# Patient Record
Sex: FEMALE | Race: BLACK OR AFRICAN AMERICAN | NOT HISPANIC OR LATINO | URBAN - METROPOLITAN AREA
[De-identification: names, ages, dates, MRNs, and addresses within clinical notes are randomized per-mention and may not be internally consistent; named-entity substitution may affect disease eponyms.]

---

## 2017-04-22 ENCOUNTER — INPATIENT (INPATIENT)
Facility: HOSPITAL | Age: 82
LOS: 9 days | Discharge: HOME | End: 2017-05-02
Attending: SURGERY | Admitting: SURGERY
Payer: MEDICARE

## 2017-04-22 VITALS
DIASTOLIC BLOOD PRESSURE: 59 MMHG | OXYGEN SATURATION: 98 % | TEMPERATURE: 98 F | HEART RATE: 64 BPM | RESPIRATION RATE: 16 BRPM | SYSTOLIC BLOOD PRESSURE: 152 MMHG

## 2017-04-22 LAB
BASE EXCESS BLDV CALC-SCNC: 5.9 MMOL/L — SIGNIFICANT CHANGE UP
BLOOD GAS VENOUS - CREATININE: 1.89 MG/DL — HIGH (ref 0.5–1.3)
CHLORIDE BLDV-SCNC: 100 MMOL/L — SIGNIFICANT CHANGE UP (ref 96–108)
GAS PNL BLDV: 129 MMOL/L — LOW (ref 136–146)
GLUCOSE BLDV-MCNC: 156 — HIGH (ref 70–99)
HCO3 BLDV-SCNC: 29 MMOL/L — HIGH (ref 20–27)
HCT VFR BLD CALC: 33.3 % — LOW (ref 34.5–45)
HCT VFR BLDV CALC: 35.9 % — SIGNIFICANT CHANGE UP (ref 34.5–45)
HGB BLD-MCNC: 11.6 G/DL — SIGNIFICANT CHANGE UP (ref 11.5–15.5)
HGB BLDV-MCNC: 11.7 G/DL — SIGNIFICANT CHANGE UP (ref 11.5–15.5)
LACTATE BLDV-MCNC: 2.1 MMOL/L — HIGH (ref 0.5–2)
MCHC RBC-ENTMCNC: 30.8 PG — SIGNIFICANT CHANGE UP (ref 27–34)
MCHC RBC-ENTMCNC: 34.8 % — SIGNIFICANT CHANGE UP (ref 32–36)
MCV RBC AUTO: 88.3 FL — SIGNIFICANT CHANGE UP (ref 80–100)
PCO2 BLDV: 45 MMHG — SIGNIFICANT CHANGE UP (ref 41–51)
PH BLDV: 7.44 PH — HIGH (ref 7.32–7.43)
PLATELET # BLD AUTO: 356 K/UL — SIGNIFICANT CHANGE UP (ref 150–400)
PMV BLD: 9.7 FL — SIGNIFICANT CHANGE UP (ref 7–13)
PO2 BLDV: 28 MMHG — LOW (ref 35–40)
POTASSIUM BLDV-SCNC: 3.8 MMOL/L — SIGNIFICANT CHANGE UP (ref 3.4–4.5)
RBC # BLD: 3.77 M/UL — LOW (ref 3.8–5.2)
RBC # FLD: 14.6 % — HIGH (ref 10.3–14.5)
SAO2 % BLDV: 50.7 % — LOW (ref 60–85)
WBC # BLD: 25.74 K/UL — HIGH (ref 3.8–10.5)
WBC # FLD AUTO: 25.74 K/UL — HIGH (ref 3.8–10.5)

## 2017-04-22 NOTE — ED ADULT TRIAGE NOTE - CHIEF COMPLAINT QUOTE
Pt arrives via EMS from home accompanied by her son. Per pts son, pt has been experiencing weakness, decreased appetite and difficulty ambulating x 2+ weeks. Pt was seen by her PMD for same on 4/14 and her son states her PMD states "its due to her age and that the most we can do is monitor the situation" and that he would set her up on a housecall program. Pts son called 911 tonight because pt started c/o abdominal pain to LLQ.

## 2017-04-22 NOTE — ED ADULT NURSE NOTE - CHPI ED SYMPTOMS NEG
no diarrhea/no nausea/no fever/no hematuria/no abdominal distension/no burning urination/no chills/no blood in stool/no dysuria/no vomiting

## 2017-04-22 NOTE — ED ADULT NURSE NOTE - OBJECTIVE STATEMENT
pt on bed aox2, to herself, and place not to time and situation c/o left lower abdominal pain non radiating for approx. 10 days on and off today is worse. also reports decrease PO intake, getting more forgetful per Relatives, denies nausea vomiting fever dysuria diarrhea constipation SOB Ha dizziness CP palpitation MD at bedside eval th ept. will monitor

## 2017-04-23 DIAGNOSIS — K85.10 BILIARY ACUTE PANCREATITIS WITHOUT NECROSIS OR INFECTION: ICD-10-CM

## 2017-04-23 LAB
ALBUMIN SERPL ELPH-MCNC: 2.4 G/DL — LOW (ref 3.3–5)
ALBUMIN SERPL ELPH-MCNC: 2.9 G/DL — LOW (ref 3.3–5)
ALP SERPL-CCNC: 504 U/L — HIGH (ref 40–120)
ALP SERPL-CCNC: 531 U/L — HIGH (ref 40–120)
ALT FLD-CCNC: 106 U/L — HIGH (ref 4–33)
ALT FLD-CCNC: 121 U/L — HIGH (ref 4–33)
ANISOCYTOSIS BLD QL: SIGNIFICANT CHANGE UP
APPEARANCE UR: SIGNIFICANT CHANGE UP
AST SERPL-CCNC: 55 U/L — HIGH (ref 4–32)
AST SERPL-CCNC: 69 U/L — HIGH (ref 4–32)
BASE EXCESS BLDV CALC-SCNC: 5.9 MMOL/L — SIGNIFICANT CHANGE UP
BASOPHILS # BLD AUTO: 0.06 K/UL — SIGNIFICANT CHANGE UP (ref 0–0.2)
BASOPHILS # BLD AUTO: 0.11 K/UL — SIGNIFICANT CHANGE UP (ref 0–0.2)
BASOPHILS NFR BLD AUTO: 0.3 % — SIGNIFICANT CHANGE UP (ref 0–2)
BASOPHILS NFR BLD AUTO: 0.4 % — SIGNIFICANT CHANGE UP (ref 0–2)
BASOPHILS NFR SPEC: 0 % — SIGNIFICANT CHANGE UP (ref 0–2)
BILIRUB SERPL-MCNC: 2.2 MG/DL — HIGH (ref 0.2–1.2)
BILIRUB SERPL-MCNC: 3.1 MG/DL — HIGH (ref 0.2–1.2)
BILIRUB UR-MCNC: NEGATIVE — SIGNIFICANT CHANGE UP
BLOOD GAS VENOUS - CREATININE: 1.66 MG/DL — HIGH (ref 0.5–1.3)
BLOOD UR QL VISUAL: NEGATIVE — SIGNIFICANT CHANGE UP
BUN SERPL-MCNC: 46 MG/DL — HIGH (ref 7–23)
BUN SERPL-MCNC: 56 MG/DL — HIGH (ref 7–23)
CA-I BLD-SCNC: 1.16 MMOL/L — SIGNIFICANT CHANGE UP (ref 1.03–1.23)
CALCIUM SERPL-MCNC: 10 MG/DL — SIGNIFICANT CHANGE UP (ref 8.4–10.5)
CALCIUM SERPL-MCNC: 9.2 MG/DL — SIGNIFICANT CHANGE UP (ref 8.4–10.5)
CHLORIDE BLDV-SCNC: 103 MMOL/L — SIGNIFICANT CHANGE UP (ref 96–108)
CHLORIDE SERPL-SCNC: 90 MMOL/L — LOW (ref 98–107)
CHLORIDE SERPL-SCNC: 99 MMOL/L — SIGNIFICANT CHANGE UP (ref 98–107)
CO2 SERPL-SCNC: 25 MMOL/L — SIGNIFICANT CHANGE UP (ref 22–31)
CO2 SERPL-SCNC: 26 MMOL/L — SIGNIFICANT CHANGE UP (ref 22–31)
COLOR SPEC: YELLOW — SIGNIFICANT CHANGE UP
CREAT SERPL-MCNC: 1.51 MG/DL — HIGH (ref 0.5–1.3)
CREAT SERPL-MCNC: 1.82 MG/DL — HIGH (ref 0.5–1.3)
EOSINOPHIL # BLD AUTO: 0.06 K/UL — SIGNIFICANT CHANGE UP (ref 0–0.5)
EOSINOPHIL # BLD AUTO: 0.09 K/UL — SIGNIFICANT CHANGE UP (ref 0–0.5)
EOSINOPHIL NFR BLD AUTO: 0.3 % — SIGNIFICANT CHANGE UP (ref 0–6)
EOSINOPHIL NFR BLD AUTO: 0.3 % — SIGNIFICANT CHANGE UP (ref 0–6)
EOSINOPHIL NFR FLD: 0 % — SIGNIFICANT CHANGE UP (ref 0–6)
GAS PNL BLDV: 129 MMOL/L — LOW (ref 136–146)
GLUCOSE BLDV-MCNC: 130 — HIGH (ref 70–99)
GLUCOSE SERPL-MCNC: 159 MG/DL — HIGH (ref 70–99)
GLUCOSE SERPL-MCNC: 86 MG/DL — SIGNIFICANT CHANGE UP (ref 70–99)
GLUCOSE UR-MCNC: NEGATIVE — SIGNIFICANT CHANGE UP
HCO3 BLDV-SCNC: 28 MMOL/L — HIGH (ref 20–27)
HCT VFR BLD CALC: 30.8 % — LOW (ref 34.5–45)
HCT VFR BLDV CALC: 34 % — LOW (ref 34.5–45)
HGB BLD-MCNC: 10.5 G/DL — LOW (ref 11.5–15.5)
HGB BLDV-MCNC: 11 G/DL — LOW (ref 11.5–15.5)
HYALINE CASTS # UR AUTO: SIGNIFICANT CHANGE UP (ref 0–?)
IMM GRANULOCYTES NFR BLD AUTO: 5.6 % — HIGH (ref 0–1.5)
IMM GRANULOCYTES NFR BLD AUTO: 6.8 % — HIGH (ref 0–1.5)
KETONES UR-MCNC: NEGATIVE — SIGNIFICANT CHANGE UP
LACTATE BLDV-MCNC: 1.9 MMOL/L — SIGNIFICANT CHANGE UP (ref 0.5–2)
LEUKOCYTE ESTERASE UR-ACNC: NEGATIVE — SIGNIFICANT CHANGE UP
LIDOCAIN IGE QN: 269.4 U/L — HIGH (ref 7–60)
LIDOCAIN IGE QN: > 600 U/L — HIGH (ref 7–60)
LYMPHOCYTES # BLD AUTO: 1.74 K/UL — SIGNIFICANT CHANGE UP (ref 1–3.3)
LYMPHOCYTES # BLD AUTO: 2.21 K/UL — SIGNIFICANT CHANGE UP (ref 1–3.3)
LYMPHOCYTES # BLD AUTO: 8.4 % — LOW (ref 13–44)
LYMPHOCYTES # BLD AUTO: 8.6 % — LOW (ref 13–44)
LYMPHOCYTES NFR SPEC AUTO: 1.7 % — LOW (ref 13–44)
MACROCYTES BLD QL: SIGNIFICANT CHANGE UP
MAGNESIUM SERPL-MCNC: 1.9 MG/DL — SIGNIFICANT CHANGE UP (ref 1.6–2.6)
MCHC RBC-ENTMCNC: 30.3 PG — SIGNIFICANT CHANGE UP (ref 27–34)
MCHC RBC-ENTMCNC: 34.1 % — SIGNIFICANT CHANGE UP (ref 32–36)
MCV RBC AUTO: 89 FL — SIGNIFICANT CHANGE UP (ref 80–100)
METAMYELOCYTES # FLD: 1.7 % — HIGH (ref 0–1)
MONOCYTES # BLD AUTO: 1.9 K/UL — HIGH (ref 0–0.9)
MONOCYTES # BLD AUTO: 2.31 K/UL — HIGH (ref 0–0.9)
MONOCYTES NFR BLD AUTO: 11.1 % — SIGNIFICANT CHANGE UP (ref 2–14)
MONOCYTES NFR BLD AUTO: 7.4 % — SIGNIFICANT CHANGE UP (ref 2–14)
MONOCYTES NFR BLD: 14.5 % — HIGH (ref 2–9)
MUCOUS THREADS # UR AUTO: SIGNIFICANT CHANGE UP
MYELOCYTES NFR BLD: 3.4 % — HIGH (ref 0–0)
NEUTROPHIL AB SER-ACNC: 75.2 % — SIGNIFICANT CHANGE UP (ref 43–77)
NEUTROPHILS # BLD AUTO: 15.42 K/UL — HIGH (ref 1.8–7.4)
NEUTROPHILS # BLD AUTO: 19.68 K/UL — HIGH (ref 1.8–7.4)
NEUTROPHILS NFR BLD AUTO: 74.3 % — SIGNIFICANT CHANGE UP (ref 43–77)
NEUTROPHILS NFR BLD AUTO: 76.5 % — SIGNIFICANT CHANGE UP (ref 43–77)
NEUTS BAND # BLD: 2.6 % — SIGNIFICANT CHANGE UP (ref 0–6)
NITRITE UR-MCNC: NEGATIVE — SIGNIFICANT CHANGE UP
PCO2 BLDV: 41 MMHG — SIGNIFICANT CHANGE UP (ref 41–51)
PH BLDV: 7.47 PH — HIGH (ref 7.32–7.43)
PH UR: 6 — SIGNIFICANT CHANGE UP (ref 4.6–8)
PHOSPHATE SERPL-MCNC: 2.7 MG/DL — SIGNIFICANT CHANGE UP (ref 2.5–4.5)
PLATELET # BLD AUTO: 386 K/UL — SIGNIFICANT CHANGE UP (ref 150–400)
PLATELET COUNT - ESTIMATE: NORMAL — SIGNIFICANT CHANGE UP
PMV BLD: 10 FL — SIGNIFICANT CHANGE UP (ref 7–13)
PO2 BLDV: < 24 MMHG — LOW (ref 35–40)
POTASSIUM BLDV-SCNC: 3.8 MMOL/L — SIGNIFICANT CHANGE UP (ref 3.4–4.5)
POTASSIUM SERPL-MCNC: 3.9 MMOL/L — SIGNIFICANT CHANGE UP (ref 3.5–5.3)
POTASSIUM SERPL-MCNC: 4.1 MMOL/L — SIGNIFICANT CHANGE UP (ref 3.5–5.3)
POTASSIUM SERPL-SCNC: 3.9 MMOL/L — SIGNIFICANT CHANGE UP (ref 3.5–5.3)
POTASSIUM SERPL-SCNC: 4.1 MMOL/L — SIGNIFICANT CHANGE UP (ref 3.5–5.3)
PROT SERPL-MCNC: 6.4 G/DL — SIGNIFICANT CHANGE UP (ref 6–8.3)
PROT SERPL-MCNC: 7 G/DL — SIGNIFICANT CHANGE UP (ref 6–8.3)
PROT UR-MCNC: 30 — HIGH
RBC # BLD: 3.46 M/UL — LOW (ref 3.8–5.2)
RBC # FLD: 14.7 % — HIGH (ref 10.3–14.5)
RBC CASTS # UR COMP ASSIST: SIGNIFICANT CHANGE UP (ref 0–?)
SAO2 % BLDV: 26.2 % — LOW (ref 60–85)
SODIUM SERPL-SCNC: 135 MMOL/L — SIGNIFICANT CHANGE UP (ref 135–145)
SODIUM SERPL-SCNC: 140 MMOL/L — SIGNIFICANT CHANGE UP (ref 135–145)
SP GR SPEC: 1.01 — SIGNIFICANT CHANGE UP (ref 1–1.03)
SPECIMEN SOURCE: SIGNIFICANT CHANGE UP
SQUAMOUS # UR AUTO: SIGNIFICANT CHANGE UP
UROBILINOGEN FLD QL: 8 E.U. — HIGH (ref 0.1–0.2)
VARIANT LYMPHS # BLD: 0.9 % — SIGNIFICANT CHANGE UP
WBC # BLD: 20.75 K/UL — HIGH (ref 3.8–10.5)
WBC # FLD AUTO: 20.75 K/UL — HIGH (ref 3.8–10.5)
WBC CLUMPS #/AREA URNS HPF: PRESENT — HIGH (ref 0–?)
WBC UR QL: HIGH (ref 0–?)

## 2017-04-23 PROCEDURE — 99223 1ST HOSP IP/OBS HIGH 75: CPT | Mod: AI,GC

## 2017-04-23 PROCEDURE — 99223 1ST HOSP IP/OBS HIGH 75: CPT | Mod: 25

## 2017-04-23 PROCEDURE — 78226 HEPATOBILIARY SYSTEM IMAGING: CPT | Mod: 26,GC

## 2017-04-23 PROCEDURE — 74176 CT ABD & PELVIS W/O CONTRAST: CPT | Mod: 26

## 2017-04-23 PROCEDURE — 76705 ECHO EXAM OF ABDOMEN: CPT | Mod: 26

## 2017-04-23 PROCEDURE — 71010: CPT | Mod: 26

## 2017-04-23 RX ORDER — TIMOLOL 0.5 %
1 DROPS OPHTHALMIC (EYE)
Qty: 0 | Refills: 0 | COMMUNITY

## 2017-04-23 RX ORDER — TIMOLOL 0.5 %
1 DROPS OPHTHALMIC (EYE)
Qty: 0 | Refills: 0 | Status: DISCONTINUED | OUTPATIENT
Start: 2017-04-23 | End: 2017-04-23

## 2017-04-23 RX ORDER — TIMOLOL 0.5 %
1 DROPS OPHTHALMIC (EYE) DAILY
Qty: 0 | Refills: 0 | Status: DISCONTINUED | OUTPATIENT
Start: 2017-04-23 | End: 2017-05-02

## 2017-04-23 RX ORDER — SODIUM CHLORIDE 9 MG/ML
1000 INJECTION INTRAMUSCULAR; INTRAVENOUS; SUBCUTANEOUS
Qty: 0 | Refills: 0 | Status: DISCONTINUED | OUTPATIENT
Start: 2017-04-23 | End: 2017-04-24

## 2017-04-23 RX ORDER — PIPERACILLIN AND TAZOBACTAM 4; .5 G/20ML; G/20ML
3.38 INJECTION, POWDER, LYOPHILIZED, FOR SOLUTION INTRAVENOUS EVERY 12 HOURS
Qty: 0 | Refills: 0 | Status: DISCONTINUED | OUTPATIENT
Start: 2017-04-23 | End: 2017-04-23

## 2017-04-23 RX ORDER — HEPARIN SODIUM 5000 [USP'U]/ML
5000 INJECTION INTRAVENOUS; SUBCUTANEOUS EVERY 8 HOURS
Qty: 0 | Refills: 0 | Status: DISCONTINUED | OUTPATIENT
Start: 2017-04-23 | End: 2017-04-27

## 2017-04-23 RX ORDER — SODIUM CHLORIDE 9 MG/ML
1000 INJECTION INTRAMUSCULAR; INTRAVENOUS; SUBCUTANEOUS ONCE
Qty: 0 | Refills: 0 | Status: COMPLETED | OUTPATIENT
Start: 2017-04-23 | End: 2017-04-23

## 2017-04-23 RX ORDER — PIPERACILLIN AND TAZOBACTAM 4; .5 G/20ML; G/20ML
3.38 INJECTION, POWDER, LYOPHILIZED, FOR SOLUTION INTRAVENOUS ONCE
Qty: 0 | Refills: 0 | Status: COMPLETED | OUTPATIENT
Start: 2017-04-23 | End: 2017-04-23

## 2017-04-23 RX ORDER — LATANOPROST 0.05 MG/ML
1 SOLUTION/ DROPS OPHTHALMIC; TOPICAL AT BEDTIME
Qty: 0 | Refills: 0 | Status: DISCONTINUED | OUTPATIENT
Start: 2017-04-23 | End: 2017-05-02

## 2017-04-23 RX ORDER — LATANOPROST 0.05 MG/ML
1 SOLUTION/ DROPS OPHTHALMIC; TOPICAL
Qty: 0 | Refills: 0 | COMMUNITY

## 2017-04-23 RX ORDER — IMIPENEM AND CILASTATIN 250; 250 MG/100ML; MG/100ML
500 INJECTION, POWDER, FOR SOLUTION INTRAVENOUS ONCE
Qty: 0 | Refills: 0 | Status: COMPLETED | OUTPATIENT
Start: 2017-04-23 | End: 2017-04-23

## 2017-04-23 RX ORDER — IMIPENEM AND CILASTATIN 250; 250 MG/100ML; MG/100ML
INJECTION, POWDER, FOR SOLUTION INTRAVENOUS
Qty: 0 | Refills: 0 | Status: DISCONTINUED | OUTPATIENT
Start: 2017-04-23 | End: 2017-04-23

## 2017-04-23 RX ORDER — IMIPENEM AND CILASTATIN 250; 250 MG/100ML; MG/100ML
500 INJECTION, POWDER, FOR SOLUTION INTRAVENOUS EVERY 12 HOURS
Qty: 0 | Refills: 0 | Status: DISCONTINUED | OUTPATIENT
Start: 2017-04-23 | End: 2017-04-27

## 2017-04-23 RX ORDER — IMIPENEM AND CILASTATIN 250; 250 MG/100ML; MG/100ML
INJECTION, POWDER, FOR SOLUTION INTRAVENOUS
Qty: 0 | Refills: 0 | Status: DISCONTINUED | OUTPATIENT
Start: 2017-04-23 | End: 2017-04-27

## 2017-04-23 RX ADMIN — IMIPENEM AND CILASTATIN 100 MILLIGRAM(S): 250; 250 INJECTION, POWDER, FOR SOLUTION INTRAVENOUS at 10:17

## 2017-04-23 RX ADMIN — SODIUM CHLORIDE 100 MILLILITER(S): 9 INJECTION INTRAMUSCULAR; INTRAVENOUS; SUBCUTANEOUS at 07:22

## 2017-04-23 RX ADMIN — IMIPENEM AND CILASTATIN 100 MILLIGRAM(S): 250; 250 INJECTION, POWDER, FOR SOLUTION INTRAVENOUS at 22:27

## 2017-04-23 RX ADMIN — SODIUM CHLORIDE 100 MILLILITER(S): 9 INJECTION INTRAMUSCULAR; INTRAVENOUS; SUBCUTANEOUS at 05:42

## 2017-04-23 RX ADMIN — HEPARIN SODIUM 5000 UNIT(S): 5000 INJECTION INTRAVENOUS; SUBCUTANEOUS at 13:12

## 2017-04-23 RX ADMIN — PIPERACILLIN AND TAZOBACTAM 200 GRAM(S): 4; .5 INJECTION, POWDER, LYOPHILIZED, FOR SOLUTION INTRAVENOUS at 02:27

## 2017-04-23 RX ADMIN — SODIUM CHLORIDE 100 MILLILITER(S): 9 INJECTION INTRAMUSCULAR; INTRAVENOUS; SUBCUTANEOUS at 19:30

## 2017-04-23 RX ADMIN — LATANOPROST 1 DROP(S): 0.05 SOLUTION/ DROPS OPHTHALMIC; TOPICAL at 22:27

## 2017-04-23 RX ADMIN — HEPARIN SODIUM 5000 UNIT(S): 5000 INJECTION INTRAVENOUS; SUBCUTANEOUS at 07:23

## 2017-04-23 RX ADMIN — HEPARIN SODIUM 5000 UNIT(S): 5000 INJECTION INTRAVENOUS; SUBCUTANEOUS at 22:27

## 2017-04-23 RX ADMIN — SODIUM CHLORIDE 1000 MILLILITER(S): 9 INJECTION INTRAMUSCULAR; INTRAVENOUS; SUBCUTANEOUS at 02:27

## 2017-04-23 NOTE — ED ADULT NURSE REASSESSMENT NOTE - NS ED NURSE REASSESS COMMENT FT1
break coverage RN - VS as noted, pt in NAD, straight catheterized for urine per MD order, additional labs and urine sent, pt medicated per orders, cleaned changed and repositioned for comfort, pt noted with stage 2 pressure ulcer to sacrum, generalized redness to sacrum and buttocks, and skin tear noted to L upper thigh. will continue to monitor pt.

## 2017-04-23 NOTE — ED PROVIDER NOTE - OBJECTIVE STATEMENT
Attending  89yo F BIBEMS from home with son  and family co abdominal pain. Son states that she has been complaining of LLQ pain for "several days" worse tonight. Dec po intake over several weeks. no vomit or diarrhea. no fever or chills. +urine and bowel incontinent. +"very weak last three days" unable to get up even with assistance. Normally is able to stand and walk w "some help" no fall/trauma. no cp or sob. no dysuria/freq or urgency.  no change in meds. no sick contact.

## 2017-04-23 NOTE — H&P ADULT. - HISTORY OF PRESENT ILLNESS
88F with 3 days of RUQ pain. No nausea, vomiting, fever, chills. She walks with a walker but lately she couldn't walk and became lethargic. She was reporting pain in RUQ, so the son brought her in. She is afebrile in ED but WBc of 25. Pain is constant and in RUQ.

## 2017-04-23 NOTE — ED PROVIDER NOTE - PHYSICAL EXAMINATION
Attending pt in bed, no acute distress. EOMI, non icteric, no juandice. mmm. normal S1-S2 no resp distress. soft nondistended abdomen tender at the LLQ. no guarding or rebound. Alert to self and place, follow simple commands. moves all ext.  bl. nl strength bl lower ext.

## 2017-04-23 NOTE — ED ADULT NURSE REASSESSMENT NOTE - NS ED NURSE REASSESS COMMENT FT1
Pt pulled catheter off, no urine at this time.  Straight cath attempted but tube will not pass.  Pt wife has urinal.  MD notified.

## 2017-04-23 NOTE — ED PROVIDER NOTE - CONSTITUTIONAL, MLM
normal... Well appearing, well nourished, awake, alert, oriented to person, place and in no apparent distress.

## 2017-04-23 NOTE — ED ADULT NURSE REASSESSMENT NOTE - NS ED NURSE REASSESS COMMENT FT1
Pt taken to US by transporter.  Pt report to SICU given by FARSHAD RN.  Pt transported by ED tech and RN to SICU.

## 2017-04-24 LAB
ALBUMIN SERPL ELPH-MCNC: 2.5 G/DL — LOW (ref 3.3–5)
ALP SERPL-CCNC: 415 U/L — HIGH (ref 40–120)
ALT FLD-CCNC: 78 U/L — HIGH (ref 4–33)
APTT BLD: 38.1 SEC — HIGH (ref 27.5–37.4)
AST SERPL-CCNC: 33 U/L — HIGH (ref 4–32)
BACTERIA UR CULT: SIGNIFICANT CHANGE UP
BILIRUB SERPL-MCNC: 1.9 MG/DL — HIGH (ref 0.2–1.2)
BUN SERPL-MCNC: 19 MG/DL — SIGNIFICANT CHANGE UP (ref 7–23)
BUN SERPL-MCNC: 25 MG/DL — HIGH (ref 7–23)
BUN SERPL-MCNC: 33 MG/DL — HIGH (ref 7–23)
CA-I BLD-SCNC: 1.15 MMOL/L — SIGNIFICANT CHANGE UP (ref 1.03–1.23)
CALCIUM SERPL-MCNC: 5.8 MG/DL — CRITICAL LOW (ref 8.4–10.5)
CALCIUM SERPL-MCNC: 9.2 MG/DL — SIGNIFICANT CHANGE UP (ref 8.4–10.5)
CALCIUM SERPL-MCNC: 9.4 MG/DL — SIGNIFICANT CHANGE UP (ref 8.4–10.5)
CHLORIDE SERPL-SCNC: 105 MMOL/L — SIGNIFICANT CHANGE UP (ref 98–107)
CHLORIDE SERPL-SCNC: 113 MMOL/L — HIGH (ref 98–107)
CHLORIDE SERPL-SCNC: 99 MMOL/L — SIGNIFICANT CHANGE UP (ref 98–107)
CO2 SERPL-SCNC: 15 MMOL/L — LOW (ref 22–31)
CO2 SERPL-SCNC: 23 MMOL/L — SIGNIFICANT CHANGE UP (ref 22–31)
CO2 SERPL-SCNC: 24 MMOL/L — SIGNIFICANT CHANGE UP (ref 22–31)
CREAT SERPL-MCNC: 0.61 MG/DL — SIGNIFICANT CHANGE UP (ref 0.5–1.3)
CREAT SERPL-MCNC: 0.96 MG/DL — SIGNIFICANT CHANGE UP (ref 0.5–1.3)
CREAT SERPL-MCNC: 1.1 MG/DL — SIGNIFICANT CHANGE UP (ref 0.5–1.3)
GLUCOSE SERPL-MCNC: 100 MG/DL — HIGH (ref 70–99)
GLUCOSE SERPL-MCNC: 120 MG/DL — HIGH (ref 70–99)
GLUCOSE SERPL-MCNC: 70 MG/DL — SIGNIFICANT CHANGE UP (ref 70–99)
HCT VFR BLD CALC: 32.9 % — LOW (ref 34.5–45)
HGB BLD-MCNC: 11 G/DL — LOW (ref 11.5–15.5)
INR BLD: 1.11 — SIGNIFICANT CHANGE UP (ref 0.88–1.17)
LIDOCAIN IGE QN: 48.9 U/L — SIGNIFICANT CHANGE UP (ref 7–60)
MAGNESIUM SERPL-MCNC: 1.7 MG/DL — SIGNIFICANT CHANGE UP (ref 1.6–2.6)
MCHC RBC-ENTMCNC: 30 PG — SIGNIFICANT CHANGE UP (ref 27–34)
MCHC RBC-ENTMCNC: 33.4 % — SIGNIFICANT CHANGE UP (ref 32–36)
MCV RBC AUTO: 89.6 FL — SIGNIFICANT CHANGE UP (ref 80–100)
PHOSPHATE SERPL-MCNC: 2.7 MG/DL — SIGNIFICANT CHANGE UP (ref 2.5–4.5)
PLATELET # BLD AUTO: 395 K/UL — SIGNIFICANT CHANGE UP (ref 150–400)
PMV BLD: 9.5 FL — SIGNIFICANT CHANGE UP (ref 7–13)
POTASSIUM SERPL-MCNC: 3.6 MMOL/L — SIGNIFICANT CHANGE UP (ref 3.5–5.3)
POTASSIUM SERPL-MCNC: 4 MMOL/L — SIGNIFICANT CHANGE UP (ref 3.5–5.3)
POTASSIUM SERPL-MCNC: 7.6 MMOL/L — CRITICAL HIGH (ref 3.5–5.3)
POTASSIUM SERPL-SCNC: 3.6 MMOL/L — SIGNIFICANT CHANGE UP (ref 3.5–5.3)
POTASSIUM SERPL-SCNC: 4 MMOL/L — SIGNIFICANT CHANGE UP (ref 3.5–5.3)
POTASSIUM SERPL-SCNC: 7.6 MMOL/L — CRITICAL HIGH (ref 3.5–5.3)
PROT SERPL-MCNC: 6.3 G/DL — SIGNIFICANT CHANGE UP (ref 6–8.3)
PROTHROM AB SERPL-ACNC: 12.5 SEC — SIGNIFICANT CHANGE UP (ref 9.8–13.1)
RBC # BLD: 3.67 M/UL — LOW (ref 3.8–5.2)
RBC # FLD: 15.2 % — HIGH (ref 10.3–14.5)
SODIUM SERPL-SCNC: 140 MMOL/L — SIGNIFICANT CHANGE UP (ref 135–145)
SODIUM SERPL-SCNC: 142 MMOL/L — SIGNIFICANT CHANGE UP (ref 135–145)
SODIUM SERPL-SCNC: 143 MMOL/L — SIGNIFICANT CHANGE UP (ref 135–145)
SPECIMEN SOURCE: SIGNIFICANT CHANGE UP
SPECIMEN SOURCE: SIGNIFICANT CHANGE UP
WBC # BLD: 16 K/UL — HIGH (ref 3.8–10.5)
WBC # FLD AUTO: 16 K/UL — HIGH (ref 3.8–10.5)

## 2017-04-24 PROCEDURE — 99232 SBSQ HOSP IP/OBS MODERATE 35: CPT | Mod: GC

## 2017-04-24 PROCEDURE — 74182 MRI ABDOMEN W/CONTRAST: CPT | Mod: 26

## 2017-04-24 RX ORDER — POTASSIUM CHLORIDE 20 MEQ
10 PACKET (EA) ORAL
Qty: 0 | Refills: 0 | Status: COMPLETED | OUTPATIENT
Start: 2017-04-24 | End: 2017-04-24

## 2017-04-24 RX ORDER — ATENOLOL 25 MG/1
25 TABLET ORAL DAILY
Qty: 0 | Refills: 0 | Status: DISCONTINUED | OUTPATIENT
Start: 2017-04-24 | End: 2017-05-02

## 2017-04-24 RX ORDER — SODIUM CHLORIDE 9 MG/ML
1000 INJECTION, SOLUTION INTRAVENOUS
Qty: 0 | Refills: 0 | Status: DISCONTINUED | OUTPATIENT
Start: 2017-04-24 | End: 2017-04-25

## 2017-04-24 RX ORDER — MAGNESIUM SULFATE 500 MG/ML
2 VIAL (ML) INJECTION ONCE
Qty: 0 | Refills: 0 | Status: COMPLETED | OUTPATIENT
Start: 2017-04-24 | End: 2017-04-24

## 2017-04-24 RX ADMIN — HEPARIN SODIUM 5000 UNIT(S): 5000 INJECTION INTRAVENOUS; SUBCUTANEOUS at 22:46

## 2017-04-24 RX ADMIN — Medication 100 MILLIEQUIVALENT(S): at 10:16

## 2017-04-24 RX ADMIN — HEPARIN SODIUM 5000 UNIT(S): 5000 INJECTION INTRAVENOUS; SUBCUTANEOUS at 15:38

## 2017-04-24 RX ADMIN — SODIUM CHLORIDE 100 MILLILITER(S): 9 INJECTION, SOLUTION INTRAVENOUS at 12:07

## 2017-04-24 RX ADMIN — IMIPENEM AND CILASTATIN 100 MILLIGRAM(S): 250; 250 INJECTION, POWDER, FOR SOLUTION INTRAVENOUS at 22:46

## 2017-04-24 RX ADMIN — Medication 100 MILLIEQUIVALENT(S): at 09:07

## 2017-04-24 RX ADMIN — IMIPENEM AND CILASTATIN 100 MILLIGRAM(S): 250; 250 INJECTION, POWDER, FOR SOLUTION INTRAVENOUS at 10:17

## 2017-04-24 RX ADMIN — Medication 100 MILLIEQUIVALENT(S): at 08:05

## 2017-04-24 RX ADMIN — Medication 1 DROP(S): at 12:07

## 2017-04-24 RX ADMIN — Medication 50 GRAM(S): at 06:00

## 2017-04-24 RX ADMIN — LATANOPROST 1 DROP(S): 0.05 SOLUTION/ DROPS OPHTHALMIC; TOPICAL at 22:46

## 2017-04-24 RX ADMIN — HEPARIN SODIUM 5000 UNIT(S): 5000 INJECTION INTRAVENOUS; SUBCUTANEOUS at 05:51

## 2017-04-24 RX ADMIN — SODIUM CHLORIDE 100 MILLILITER(S): 9 INJECTION, SOLUTION INTRAVENOUS at 19:30

## 2017-04-25 LAB
-  AMIKACIN: SIGNIFICANT CHANGE UP
-  AMPICILLIN/SULBACTAM: SIGNIFICANT CHANGE UP
-  AMPICILLIN: SIGNIFICANT CHANGE UP
-  AZTREONAM: SIGNIFICANT CHANGE UP
-  CEFAZOLIN: SIGNIFICANT CHANGE UP
-  CEFEPIME: SIGNIFICANT CHANGE UP
-  CEFOXITIN: SIGNIFICANT CHANGE UP
-  CEFTAZIDIME: SIGNIFICANT CHANGE UP
-  CEFTRIAXONE: SIGNIFICANT CHANGE UP
-  CIPROFLOXACIN: SIGNIFICANT CHANGE UP
-  ERTAPENEM: SIGNIFICANT CHANGE UP
-  GENTAMICIN: SIGNIFICANT CHANGE UP
-  IMIPENEM: SIGNIFICANT CHANGE UP
-  LEVOFLOXACIN: SIGNIFICANT CHANGE UP
-  MEROPENEM: SIGNIFICANT CHANGE UP
-  PIPERACILLIN/TAZOBACTAM: SIGNIFICANT CHANGE UP
-  TIGECYCLINE: SIGNIFICANT CHANGE UP
-  TOBRAMYCIN: SIGNIFICANT CHANGE UP
-  TRIMETHOPRIM/SULFAMETHOXAZOLE: SIGNIFICANT CHANGE UP
ALBUMIN SERPL ELPH-MCNC: 2.4 G/DL — LOW (ref 3.3–5)
ALBUMIN SERPL ELPH-MCNC: 2.5 G/DL — LOW (ref 3.3–5)
ALP SERPL-CCNC: 314 U/L — HIGH (ref 40–120)
ALP SERPL-CCNC: 335 U/L — HIGH (ref 40–120)
ALT FLD-CCNC: 51 U/L — HIGH (ref 4–33)
ALT FLD-CCNC: 54 U/L — HIGH (ref 4–33)
AST SERPL-CCNC: 23 U/L — SIGNIFICANT CHANGE UP (ref 4–32)
AST SERPL-CCNC: 23 U/L — SIGNIFICANT CHANGE UP (ref 4–32)
BASOPHILS # BLD AUTO: 0.03 K/UL — SIGNIFICANT CHANGE UP (ref 0–0.2)
BASOPHILS # BLD AUTO: 0.04 K/UL — SIGNIFICANT CHANGE UP (ref 0–0.2)
BASOPHILS NFR BLD AUTO: 0.2 % — SIGNIFICANT CHANGE UP (ref 0–2)
BASOPHILS NFR BLD AUTO: 0.3 % — SIGNIFICANT CHANGE UP (ref 0–2)
BILIRUB SERPL-MCNC: 1.3 MG/DL — HIGH (ref 0.2–1.2)
BILIRUB SERPL-MCNC: 1.5 MG/DL — HIGH (ref 0.2–1.2)
BUN SERPL-MCNC: 13 MG/DL — SIGNIFICANT CHANGE UP (ref 7–23)
BUN SERPL-MCNC: 16 MG/DL — SIGNIFICANT CHANGE UP (ref 7–23)
CA-I BLD-SCNC: 1.18 MMOL/L — SIGNIFICANT CHANGE UP (ref 1.03–1.23)
CALCIUM SERPL-MCNC: 8.9 MG/DL — SIGNIFICANT CHANGE UP (ref 8.4–10.5)
CALCIUM SERPL-MCNC: 9 MG/DL — SIGNIFICANT CHANGE UP (ref 8.4–10.5)
CHLORIDE SERPL-SCNC: 102 MMOL/L — SIGNIFICANT CHANGE UP (ref 98–107)
CHLORIDE SERPL-SCNC: 106 MMOL/L — SIGNIFICANT CHANGE UP (ref 98–107)
CO2 SERPL-SCNC: 22 MMOL/L — SIGNIFICANT CHANGE UP (ref 22–31)
CO2 SERPL-SCNC: 24 MMOL/L — SIGNIFICANT CHANGE UP (ref 22–31)
CREAT SERPL-MCNC: 0.85 MG/DL — SIGNIFICANT CHANGE UP (ref 0.5–1.3)
CREAT SERPL-MCNC: 0.86 MG/DL — SIGNIFICANT CHANGE UP (ref 0.5–1.3)
EOSINOPHIL # BLD AUTO: 0.07 K/UL — SIGNIFICANT CHANGE UP (ref 0–0.5)
EOSINOPHIL # BLD AUTO: 0.09 K/UL — SIGNIFICANT CHANGE UP (ref 0–0.5)
EOSINOPHIL NFR BLD AUTO: 0.5 % — SIGNIFICANT CHANGE UP (ref 0–6)
EOSINOPHIL NFR BLD AUTO: 0.7 % — SIGNIFICANT CHANGE UP (ref 0–6)
GLUCOSE SERPL-MCNC: 107 MG/DL — HIGH (ref 70–99)
GLUCOSE SERPL-MCNC: 163 MG/DL — HIGH (ref 70–99)
GRAM STN WND: SIGNIFICANT CHANGE UP
HCT VFR BLD CALC: 30.6 % — LOW (ref 34.5–45)
HCT VFR BLD CALC: 31.2 % — LOW (ref 34.5–45)
HGB BLD-MCNC: 10.1 G/DL — LOW (ref 11.5–15.5)
HGB BLD-MCNC: 10.3 G/DL — LOW (ref 11.5–15.5)
IMM GRANULOCYTES NFR BLD AUTO: 7.5 % — HIGH (ref 0–1.5)
IMM GRANULOCYTES NFR BLD AUTO: 7.7 % — HIGH (ref 0–1.5)
LIDOCAIN IGE QN: 38.8 U/L — SIGNIFICANT CHANGE UP (ref 7–60)
LYMPHOCYTES # BLD AUTO: 1.67 K/UL — SIGNIFICANT CHANGE UP (ref 1–3.3)
LYMPHOCYTES # BLD AUTO: 12.2 % — LOW (ref 13–44)
LYMPHOCYTES # BLD AUTO: 17.6 % — SIGNIFICANT CHANGE UP (ref 13–44)
LYMPHOCYTES # BLD AUTO: 2.48 K/UL — SIGNIFICANT CHANGE UP (ref 1–3.3)
MAGNESIUM SERPL-MCNC: 1.7 MG/DL — SIGNIFICANT CHANGE UP (ref 1.6–2.6)
MANUAL SMEAR VERIFICATION: SIGNIFICANT CHANGE UP
MCHC RBC-ENTMCNC: 29.8 PG — SIGNIFICANT CHANGE UP (ref 27–34)
MCHC RBC-ENTMCNC: 30 PG — SIGNIFICANT CHANGE UP (ref 27–34)
MCHC RBC-ENTMCNC: 33 % — SIGNIFICANT CHANGE UP (ref 32–36)
MCHC RBC-ENTMCNC: 33 % — SIGNIFICANT CHANGE UP (ref 32–36)
MCV RBC AUTO: 90.3 FL — SIGNIFICANT CHANGE UP (ref 80–100)
MCV RBC AUTO: 91 FL — SIGNIFICANT CHANGE UP (ref 80–100)
METHOD TYPE: SIGNIFICANT CHANGE UP
MONOCYTES # BLD AUTO: 1.12 K/UL — HIGH (ref 0–0.9)
MONOCYTES # BLD AUTO: 1.69 K/UL — HIGH (ref 0–0.9)
MONOCYTES NFR BLD AUTO: 12.4 % — SIGNIFICANT CHANGE UP (ref 2–14)
MONOCYTES NFR BLD AUTO: 7.9 % — SIGNIFICANT CHANGE UP (ref 2–14)
NEUTROPHILS # BLD AUTO: 9.13 K/UL — HIGH (ref 1.8–7.4)
NEUTROPHILS # BLD AUTO: 9.34 K/UL — HIGH (ref 1.8–7.4)
NEUTROPHILS NFR BLD AUTO: 66.3 % — SIGNIFICANT CHANGE UP (ref 43–77)
NEUTROPHILS NFR BLD AUTO: 66.7 % — SIGNIFICANT CHANGE UP (ref 43–77)
ORGANISM # SPEC MICROSCOPIC CNT: SIGNIFICANT CHANGE UP
ORGANISM # SPEC MICROSCOPIC CNT: SIGNIFICANT CHANGE UP
PHOSPHATE SERPL-MCNC: 1.8 MG/DL — LOW (ref 2.5–4.5)
PLATELET # BLD AUTO: 419 K/UL — HIGH (ref 150–400)
PLATELET # BLD AUTO: 427 K/UL — HIGH (ref 150–400)
PMV BLD: 9.1 FL — SIGNIFICANT CHANGE UP (ref 7–13)
PMV BLD: 9.2 FL — SIGNIFICANT CHANGE UP (ref 7–13)
POTASSIUM SERPL-MCNC: 3.6 MMOL/L — SIGNIFICANT CHANGE UP (ref 3.5–5.3)
POTASSIUM SERPL-MCNC: 4.1 MMOL/L — SIGNIFICANT CHANGE UP (ref 3.5–5.3)
POTASSIUM SERPL-SCNC: 3.6 MMOL/L — SIGNIFICANT CHANGE UP (ref 3.5–5.3)
POTASSIUM SERPL-SCNC: 4.1 MMOL/L — SIGNIFICANT CHANGE UP (ref 3.5–5.3)
PROT SERPL-MCNC: 5.9 G/DL — LOW (ref 6–8.3)
PROT SERPL-MCNC: 5.9 G/DL — LOW (ref 6–8.3)
RBC # BLD: 3.39 M/UL — LOW (ref 3.8–5.2)
RBC # BLD: 3.43 M/UL — LOW (ref 3.8–5.2)
RBC # FLD: 15.1 % — HIGH (ref 10.3–14.5)
RBC # FLD: 15.2 % — HIGH (ref 10.3–14.5)
SODIUM SERPL-SCNC: 139 MMOL/L — SIGNIFICANT CHANGE UP (ref 135–145)
SODIUM SERPL-SCNC: 141 MMOL/L — SIGNIFICANT CHANGE UP (ref 135–145)
SPECIMEN SOURCE: SIGNIFICANT CHANGE UP
SPECIMEN SOURCE: SIGNIFICANT CHANGE UP
WBC # BLD: 13.67 K/UL — HIGH (ref 3.8–10.5)
WBC # BLD: 14.09 K/UL — HIGH (ref 3.8–10.5)
WBC # FLD AUTO: 13.67 K/UL — HIGH (ref 3.8–10.5)
WBC # FLD AUTO: 14.09 K/UL — HIGH (ref 3.8–10.5)

## 2017-04-25 PROCEDURE — 99232 SBSQ HOSP IP/OBS MODERATE 35: CPT | Mod: GC

## 2017-04-25 PROCEDURE — 47490 INCISION OF GALLBLADDER: CPT | Mod: GC

## 2017-04-25 RX ORDER — POTASSIUM PHOSPHATE, MONOBASIC POTASSIUM PHOSPHATE, DIBASIC 236; 224 MG/ML; MG/ML
15 INJECTION, SOLUTION INTRAVENOUS ONCE
Qty: 0 | Refills: 0 | Status: COMPLETED | OUTPATIENT
Start: 2017-04-25 | End: 2017-04-25

## 2017-04-25 RX ORDER — SODIUM CHLORIDE 9 MG/ML
1000 INJECTION, SOLUTION INTRAVENOUS
Qty: 0 | Refills: 0 | Status: DISCONTINUED | OUTPATIENT
Start: 2017-04-25 | End: 2017-04-26

## 2017-04-25 RX ORDER — MAGNESIUM SULFATE 500 MG/ML
2 VIAL (ML) INJECTION ONCE
Qty: 0 | Refills: 0 | Status: COMPLETED | OUTPATIENT
Start: 2017-04-25 | End: 2017-04-25

## 2017-04-25 RX ORDER — AMLODIPINE BESYLATE 2.5 MG/1
5 TABLET ORAL DAILY
Qty: 0 | Refills: 0 | Status: DISCONTINUED | OUTPATIENT
Start: 2017-04-25 | End: 2017-05-02

## 2017-04-25 RX ADMIN — HEPARIN SODIUM 5000 UNIT(S): 5000 INJECTION INTRAVENOUS; SUBCUTANEOUS at 06:27

## 2017-04-25 RX ADMIN — AMLODIPINE BESYLATE 5 MILLIGRAM(S): 2.5 TABLET ORAL at 11:33

## 2017-04-25 RX ADMIN — Medication 1 DROP(S): at 11:34

## 2017-04-25 RX ADMIN — POTASSIUM PHOSPHATE, MONOBASIC POTASSIUM PHOSPHATE, DIBASIC 62.5 MILLIMOLE(S): 236; 224 INJECTION, SOLUTION INTRAVENOUS at 06:43

## 2017-04-25 RX ADMIN — POTASSIUM PHOSPHATE, MONOBASIC POTASSIUM PHOSPHATE, DIBASIC 62.5 MILLIMOLE(S): 236; 224 INJECTION, SOLUTION INTRAVENOUS at 11:32

## 2017-04-25 RX ADMIN — SODIUM CHLORIDE 75 MILLILITER(S): 9 INJECTION, SOLUTION INTRAVENOUS at 20:25

## 2017-04-25 RX ADMIN — HEPARIN SODIUM 5000 UNIT(S): 5000 INJECTION INTRAVENOUS; SUBCUTANEOUS at 14:17

## 2017-04-25 RX ADMIN — IMIPENEM AND CILASTATIN 100 MILLIGRAM(S): 250; 250 INJECTION, POWDER, FOR SOLUTION INTRAVENOUS at 10:45

## 2017-04-25 RX ADMIN — LATANOPROST 1 DROP(S): 0.05 SOLUTION/ DROPS OPHTHALMIC; TOPICAL at 22:36

## 2017-04-25 RX ADMIN — IMIPENEM AND CILASTATIN 100 MILLIGRAM(S): 250; 250 INJECTION, POWDER, FOR SOLUTION INTRAVENOUS at 22:30

## 2017-04-25 RX ADMIN — HEPARIN SODIUM 5000 UNIT(S): 5000 INJECTION INTRAVENOUS; SUBCUTANEOUS at 22:36

## 2017-04-25 RX ADMIN — ATENOLOL 25 MILLIGRAM(S): 25 TABLET ORAL at 06:27

## 2017-04-25 RX ADMIN — Medication 50 GRAM(S): at 11:32

## 2017-04-26 LAB
ALBUMIN SERPL ELPH-MCNC: 2.3 G/DL — LOW (ref 3.3–5)
ALP SERPL-CCNC: 270 U/L — HIGH (ref 40–120)
ALT FLD-CCNC: 42 U/L — HIGH (ref 4–33)
AST SERPL-CCNC: 18 U/L — SIGNIFICANT CHANGE UP (ref 4–32)
BASOPHILS # BLD AUTO: 0.03 K/UL — SIGNIFICANT CHANGE UP (ref 0–0.2)
BASOPHILS NFR BLD AUTO: 0.2 % — SIGNIFICANT CHANGE UP (ref 0–2)
BILIRUB SERPL-MCNC: 1.2 MG/DL — SIGNIFICANT CHANGE UP (ref 0.2–1.2)
BUN SERPL-MCNC: 10 MG/DL — SIGNIFICANT CHANGE UP (ref 7–23)
CALCIUM SERPL-MCNC: 9.1 MG/DL — SIGNIFICANT CHANGE UP (ref 8.4–10.5)
CHLORIDE SERPL-SCNC: 106 MMOL/L — SIGNIFICANT CHANGE UP (ref 98–107)
CO2 SERPL-SCNC: 21 MMOL/L — LOW (ref 22–31)
CREAT SERPL-MCNC: 0.81 MG/DL — SIGNIFICANT CHANGE UP (ref 0.5–1.3)
EOSINOPHIL # BLD AUTO: 0.1 K/UL — SIGNIFICANT CHANGE UP (ref 0–0.5)
EOSINOPHIL NFR BLD AUTO: 0.7 % — SIGNIFICANT CHANGE UP (ref 0–6)
GLUCOSE SERPL-MCNC: 117 MG/DL — HIGH (ref 70–99)
HCT VFR BLD CALC: 29.2 % — LOW (ref 34.5–45)
HGB BLD-MCNC: 9.6 G/DL — LOW (ref 11.5–15.5)
IMM GRANULOCYTES NFR BLD AUTO: 7.3 % — HIGH (ref 0–1.5)
LYMPHOCYTES # BLD AUTO: 18.4 % — SIGNIFICANT CHANGE UP (ref 13–44)
LYMPHOCYTES # BLD AUTO: 2.48 K/UL — SIGNIFICANT CHANGE UP (ref 1–3.3)
MAGNESIUM SERPL-MCNC: 1.8 MG/DL — SIGNIFICANT CHANGE UP (ref 1.6–2.6)
MCHC RBC-ENTMCNC: 30.2 PG — SIGNIFICANT CHANGE UP (ref 27–34)
MCHC RBC-ENTMCNC: 32.9 % — SIGNIFICANT CHANGE UP (ref 32–36)
MCV RBC AUTO: 91.8 FL — SIGNIFICANT CHANGE UP (ref 80–100)
MONOCYTES # BLD AUTO: 1.38 K/UL — HIGH (ref 0–0.9)
MONOCYTES NFR BLD AUTO: 10.2 % — SIGNIFICANT CHANGE UP (ref 2–14)
NEUTROPHILS # BLD AUTO: 8.52 K/UL — HIGH (ref 1.8–7.4)
NEUTROPHILS NFR BLD AUTO: 63.2 % — SIGNIFICANT CHANGE UP (ref 43–77)
PHOSPHATE SERPL-MCNC: 2.8 MG/DL — SIGNIFICANT CHANGE UP (ref 2.5–4.5)
PLATELET # BLD AUTO: 455 K/UL — HIGH (ref 150–400)
PMV BLD: 9.6 FL — SIGNIFICANT CHANGE UP (ref 7–13)
POTASSIUM SERPL-MCNC: 3.6 MMOL/L — SIGNIFICANT CHANGE UP (ref 3.5–5.3)
POTASSIUM SERPL-SCNC: 3.6 MMOL/L — SIGNIFICANT CHANGE UP (ref 3.5–5.3)
PROT SERPL-MCNC: 5.8 G/DL — LOW (ref 6–8.3)
RBC # BLD: 3.18 M/UL — LOW (ref 3.8–5.2)
RBC # FLD: 15.5 % — HIGH (ref 10.3–14.5)
SODIUM SERPL-SCNC: 139 MMOL/L — SIGNIFICANT CHANGE UP (ref 135–145)
WBC # BLD: 13.49 K/UL — HIGH (ref 3.8–10.5)
WBC # FLD AUTO: 13.49 K/UL — HIGH (ref 3.8–10.5)

## 2017-04-26 PROCEDURE — 99232 SBSQ HOSP IP/OBS MODERATE 35: CPT

## 2017-04-26 PROCEDURE — 99232 SBSQ HOSP IP/OBS MODERATE 35: CPT | Mod: GC

## 2017-04-26 RX ORDER — POTASSIUM CHLORIDE 20 MEQ
10 PACKET (EA) ORAL
Qty: 0 | Refills: 0 | Status: COMPLETED | OUTPATIENT
Start: 2017-04-26 | End: 2017-04-26

## 2017-04-26 RX ORDER — MAGNESIUM SULFATE 500 MG/ML
2 VIAL (ML) INJECTION ONCE
Qty: 0 | Refills: 0 | Status: COMPLETED | OUTPATIENT
Start: 2017-04-26 | End: 2017-04-26

## 2017-04-26 RX ADMIN — Medication 100 MILLIEQUIVALENT(S): at 13:25

## 2017-04-26 RX ADMIN — Medication 100 MILLIEQUIVALENT(S): at 09:06

## 2017-04-26 RX ADMIN — IMIPENEM AND CILASTATIN 100 MILLIGRAM(S): 250; 250 INJECTION, POWDER, FOR SOLUTION INTRAVENOUS at 21:57

## 2017-04-26 RX ADMIN — HEPARIN SODIUM 5000 UNIT(S): 5000 INJECTION INTRAVENOUS; SUBCUTANEOUS at 21:53

## 2017-04-26 RX ADMIN — LATANOPROST 1 DROP(S): 0.05 SOLUTION/ DROPS OPHTHALMIC; TOPICAL at 21:57

## 2017-04-26 RX ADMIN — AMLODIPINE BESYLATE 5 MILLIGRAM(S): 2.5 TABLET ORAL at 05:41

## 2017-04-26 RX ADMIN — Medication 50 GRAM(S): at 08:02

## 2017-04-26 RX ADMIN — SODIUM CHLORIDE 75 MILLILITER(S): 9 INJECTION, SOLUTION INTRAVENOUS at 00:12

## 2017-04-26 RX ADMIN — IMIPENEM AND CILASTATIN 100 MILLIGRAM(S): 250; 250 INJECTION, POWDER, FOR SOLUTION INTRAVENOUS at 10:26

## 2017-04-26 RX ADMIN — Medication 100 MILLIEQUIVALENT(S): at 11:20

## 2017-04-26 RX ADMIN — Medication 1 DROP(S): at 12:46

## 2017-04-26 RX ADMIN — HEPARIN SODIUM 5000 UNIT(S): 5000 INJECTION INTRAVENOUS; SUBCUTANEOUS at 05:44

## 2017-04-26 RX ADMIN — HEPARIN SODIUM 5000 UNIT(S): 5000 INJECTION INTRAVENOUS; SUBCUTANEOUS at 13:54

## 2017-04-26 NOTE — PHYSICAL THERAPY INITIAL EVALUATION ADULT - PERTINENT HX OF CURRENT PROBLEM, REHAB EVAL
88F with 3 days of RUQ pain. No nausea, vomiting, fever, chills. She walks with a walker but lately she couldn't walk and became lethargic.

## 2017-04-27 LAB
ALBUMIN SERPL ELPH-MCNC: 2.4 G/DL — LOW (ref 3.3–5)
ALP SERPL-CCNC: 233 U/L — HIGH (ref 40–120)
ALT FLD-CCNC: 32 U/L — SIGNIFICANT CHANGE UP (ref 4–33)
AST SERPL-CCNC: 19 U/L — SIGNIFICANT CHANGE UP (ref 4–32)
BILIRUB SERPL-MCNC: 0.9 MG/DL — SIGNIFICANT CHANGE UP (ref 0.2–1.2)
BUN SERPL-MCNC: 9 MG/DL — SIGNIFICANT CHANGE UP (ref 7–23)
CALCIUM SERPL-MCNC: 9.2 MG/DL — SIGNIFICANT CHANGE UP (ref 8.4–10.5)
CHLORIDE SERPL-SCNC: 108 MMOL/L — HIGH (ref 98–107)
CO2 SERPL-SCNC: 22 MMOL/L — SIGNIFICANT CHANGE UP (ref 22–31)
CREAT SERPL-MCNC: 0.87 MG/DL — SIGNIFICANT CHANGE UP (ref 0.5–1.3)
GLUCOSE SERPL-MCNC: 88 MG/DL — SIGNIFICANT CHANGE UP (ref 70–99)
GRAM STN WND: SIGNIFICANT CHANGE UP
HCT VFR BLD CALC: 30.4 % — LOW (ref 34.5–45)
HGB BLD-MCNC: 10 G/DL — LOW (ref 11.5–15.5)
MAGNESIUM SERPL-MCNC: 1.8 MG/DL — SIGNIFICANT CHANGE UP (ref 1.6–2.6)
MCHC RBC-ENTMCNC: 30.2 PG — SIGNIFICANT CHANGE UP (ref 27–34)
MCHC RBC-ENTMCNC: 32.9 % — SIGNIFICANT CHANGE UP (ref 32–36)
MCV RBC AUTO: 91.8 FL — SIGNIFICANT CHANGE UP (ref 80–100)
METHOD TYPE: SIGNIFICANT CHANGE UP
METHOD TYPE: SIGNIFICANT CHANGE UP
ORGANISM # SPEC MICROSCOPIC CNT: SIGNIFICANT CHANGE UP
PHOSPHATE SERPL-MCNC: 2.9 MG/DL — SIGNIFICANT CHANGE UP (ref 2.5–4.5)
PLATELET # BLD AUTO: 479 K/UL — HIGH (ref 150–400)
PMV BLD: 9.3 FL — SIGNIFICANT CHANGE UP (ref 7–13)
POTASSIUM SERPL-MCNC: 4.3 MMOL/L — SIGNIFICANT CHANGE UP (ref 3.5–5.3)
POTASSIUM SERPL-SCNC: 4.3 MMOL/L — SIGNIFICANT CHANGE UP (ref 3.5–5.3)
PROT SERPL-MCNC: 5.9 G/DL — LOW (ref 6–8.3)
RBC # BLD: 3.31 M/UL — LOW (ref 3.8–5.2)
RBC # FLD: 15.7 % — HIGH (ref 10.3–14.5)
SODIUM SERPL-SCNC: 141 MMOL/L — SIGNIFICANT CHANGE UP (ref 135–145)
SPECIMEN SOURCE: SIGNIFICANT CHANGE UP
WBC # BLD: 13.14 K/UL — HIGH (ref 3.8–10.5)
WBC # FLD AUTO: 13.14 K/UL — HIGH (ref 3.8–10.5)

## 2017-04-27 PROCEDURE — 99232 SBSQ HOSP IP/OBS MODERATE 35: CPT

## 2017-04-27 PROCEDURE — 99232 SBSQ HOSP IP/OBS MODERATE 35: CPT | Mod: GC

## 2017-04-27 RX ORDER — ENOXAPARIN SODIUM 100 MG/ML
40 INJECTION SUBCUTANEOUS DAILY
Qty: 0 | Refills: 0 | Status: DISCONTINUED | OUTPATIENT
Start: 2017-04-27 | End: 2017-05-02

## 2017-04-27 RX ORDER — SODIUM CHLORIDE 9 MG/ML
1000 INJECTION, SOLUTION INTRAVENOUS
Qty: 0 | Refills: 0 | Status: DISCONTINUED | OUTPATIENT
Start: 2017-04-27 | End: 2017-04-28

## 2017-04-27 RX ORDER — HYDRALAZINE HCL 50 MG
10 TABLET ORAL ONCE
Qty: 0 | Refills: 0 | Status: COMPLETED | OUTPATIENT
Start: 2017-04-27 | End: 2017-04-27

## 2017-04-27 RX ADMIN — ATENOLOL 25 MILLIGRAM(S): 25 TABLET ORAL at 05:13

## 2017-04-27 RX ADMIN — LATANOPROST 1 DROP(S): 0.05 SOLUTION/ DROPS OPHTHALMIC; TOPICAL at 22:36

## 2017-04-27 RX ADMIN — AMLODIPINE BESYLATE 5 MILLIGRAM(S): 2.5 TABLET ORAL at 05:13

## 2017-04-27 RX ADMIN — Medication 10 MILLIGRAM(S): at 23:40

## 2017-04-27 RX ADMIN — Medication 1 TABLET(S): at 19:02

## 2017-04-27 RX ADMIN — Medication 1 DROP(S): at 11:55

## 2017-04-27 RX ADMIN — ENOXAPARIN SODIUM 40 MILLIGRAM(S): 100 INJECTION SUBCUTANEOUS at 11:55

## 2017-04-28 LAB
-  AMIKACIN: SIGNIFICANT CHANGE UP
-  AMIKACIN: SIGNIFICANT CHANGE UP
-  AMPICILLIN/SULBACTAM: SIGNIFICANT CHANGE UP
-  AMPICILLIN/SULBACTAM: SIGNIFICANT CHANGE UP
-  AMPICILLIN: SIGNIFICANT CHANGE UP
-  AMPICILLIN: SIGNIFICANT CHANGE UP
-  AZTREONAM: SIGNIFICANT CHANGE UP
-  AZTREONAM: SIGNIFICANT CHANGE UP
-  CEFAZOLIN: SIGNIFICANT CHANGE UP
-  CEFAZOLIN: SIGNIFICANT CHANGE UP
-  CEFEPIME: SIGNIFICANT CHANGE UP
-  CEFEPIME: SIGNIFICANT CHANGE UP
-  CEFOXITIN: SIGNIFICANT CHANGE UP
-  CEFOXITIN: SIGNIFICANT CHANGE UP
-  CEFTAZIDIME: SIGNIFICANT CHANGE UP
-  CEFTAZIDIME: SIGNIFICANT CHANGE UP
-  CEFTRIAXONE: SIGNIFICANT CHANGE UP
-  CEFTRIAXONE: SIGNIFICANT CHANGE UP
-  CIPROFLOXACIN: SIGNIFICANT CHANGE UP
-  CIPROFLOXACIN: SIGNIFICANT CHANGE UP
-  ERTAPENEM: SIGNIFICANT CHANGE UP
-  ERTAPENEM: SIGNIFICANT CHANGE UP
-  GENTAMICIN: SIGNIFICANT CHANGE UP
-  GENTAMICIN: SIGNIFICANT CHANGE UP
-  IMIPENEM: SIGNIFICANT CHANGE UP
-  IMIPENEM: SIGNIFICANT CHANGE UP
-  LEVOFLOXACIN: SIGNIFICANT CHANGE UP
-  LEVOFLOXACIN: SIGNIFICANT CHANGE UP
-  MEROPENEM: SIGNIFICANT CHANGE UP
-  MEROPENEM: SIGNIFICANT CHANGE UP
-  PENICILLIN: SIGNIFICANT CHANGE UP
-  PIPERACILLIN/TAZOBACTAM: SIGNIFICANT CHANGE UP
-  PIPERACILLIN/TAZOBACTAM: SIGNIFICANT CHANGE UP
-  TIGECYCLINE: SIGNIFICANT CHANGE UP
-  TIGECYCLINE: SIGNIFICANT CHANGE UP
-  TOBRAMYCIN: SIGNIFICANT CHANGE UP
-  TOBRAMYCIN: SIGNIFICANT CHANGE UP
-  TRIMETHOPRIM/SULFAMETHOXAZOLE: SIGNIFICANT CHANGE UP
-  TRIMETHOPRIM/SULFAMETHOXAZOLE: SIGNIFICANT CHANGE UP
-  VANCOMYCIN: SIGNIFICANT CHANGE UP
ALBUMIN SERPL ELPH-MCNC: 2.5 G/DL — LOW (ref 3.3–5)
ALP SERPL-CCNC: 211 U/L — HIGH (ref 40–120)
ALT FLD-CCNC: 30 U/L — SIGNIFICANT CHANGE UP (ref 4–33)
AST SERPL-CCNC: 20 U/L — SIGNIFICANT CHANGE UP (ref 4–32)
BACTERIA BLD CULT: SIGNIFICANT CHANGE UP
BILIRUB SERPL-MCNC: 0.8 MG/DL — SIGNIFICANT CHANGE UP (ref 0.2–1.2)
BUN SERPL-MCNC: 9 MG/DL — SIGNIFICANT CHANGE UP (ref 7–23)
CALCIUM SERPL-MCNC: 9.3 MG/DL — SIGNIFICANT CHANGE UP (ref 8.4–10.5)
CHLORIDE SERPL-SCNC: 106 MMOL/L — SIGNIFICANT CHANGE UP (ref 98–107)
CO2 SERPL-SCNC: 21 MMOL/L — LOW (ref 22–31)
CREAT SERPL-MCNC: 0.8 MG/DL — SIGNIFICANT CHANGE UP (ref 0.5–1.3)
CULTURE - SURGICAL SITE: SIGNIFICANT CHANGE UP
GLUCOSE SERPL-MCNC: 83 MG/DL — SIGNIFICANT CHANGE UP (ref 70–99)
HCT VFR BLD CALC: 31.1 % — LOW (ref 34.5–45)
HGB BLD-MCNC: 10.2 G/DL — LOW (ref 11.5–15.5)
MCHC RBC-ENTMCNC: 30.2 PG — SIGNIFICANT CHANGE UP (ref 27–34)
MCHC RBC-ENTMCNC: 32.8 % — SIGNIFICANT CHANGE UP (ref 32–36)
MCV RBC AUTO: 92 FL — SIGNIFICANT CHANGE UP (ref 80–100)
METHOD TYPE: SIGNIFICANT CHANGE UP
PLATELET # BLD AUTO: 550 K/UL — HIGH (ref 150–400)
PMV BLD: 9.2 FL — SIGNIFICANT CHANGE UP (ref 7–13)
POTASSIUM SERPL-MCNC: 4.5 MMOL/L — SIGNIFICANT CHANGE UP (ref 3.5–5.3)
POTASSIUM SERPL-SCNC: 4.5 MMOL/L — SIGNIFICANT CHANGE UP (ref 3.5–5.3)
PROT SERPL-MCNC: 6.1 G/DL — SIGNIFICANT CHANGE UP (ref 6–8.3)
RBC # BLD: 3.38 M/UL — LOW (ref 3.8–5.2)
RBC # FLD: 15.8 % — HIGH (ref 10.3–14.5)
SODIUM SERPL-SCNC: 141 MMOL/L — SIGNIFICANT CHANGE UP (ref 135–145)
WBC # BLD: 14.02 K/UL — HIGH (ref 3.8–10.5)
WBC # FLD AUTO: 14.02 K/UL — HIGH (ref 3.8–10.5)

## 2017-04-28 PROCEDURE — 43239 EGD BIOPSY SINGLE/MULTIPLE: CPT | Mod: 59,GC

## 2017-04-28 PROCEDURE — 88312 SPECIAL STAINS GROUP 1: CPT | Mod: 26

## 2017-04-28 PROCEDURE — 88305 TISSUE EXAM BY PATHOLOGIST: CPT | Mod: 26

## 2017-04-28 PROCEDURE — 43264 ERCP REMOVE DUCT CALCULI: CPT | Mod: GC

## 2017-04-28 PROCEDURE — 43262 ENDO CHOLANGIOPANCREATOGRAPH: CPT | Mod: GC

## 2017-04-28 PROCEDURE — 43259 EGD US EXAM DUODENUM/JEJUNUM: CPT | Mod: GC

## 2017-04-28 RX ORDER — SODIUM CHLORIDE 9 MG/ML
1000 INJECTION, SOLUTION INTRAVENOUS
Qty: 0 | Refills: 0 | Status: DISCONTINUED | OUTPATIENT
Start: 2017-04-28 | End: 2017-05-01

## 2017-04-28 RX ADMIN — SODIUM CHLORIDE 75 MILLILITER(S): 9 INJECTION, SOLUTION INTRAVENOUS at 00:11

## 2017-04-28 RX ADMIN — Medication 1 DROP(S): at 12:36

## 2017-04-28 RX ADMIN — Medication 1 TABLET(S): at 06:03

## 2017-04-28 RX ADMIN — ATENOLOL 25 MILLIGRAM(S): 25 TABLET ORAL at 06:03

## 2017-04-28 RX ADMIN — AMLODIPINE BESYLATE 5 MILLIGRAM(S): 2.5 TABLET ORAL at 06:03

## 2017-04-28 RX ADMIN — SODIUM CHLORIDE 75 MILLILITER(S): 9 INJECTION, SOLUTION INTRAVENOUS at 06:02

## 2017-04-28 RX ADMIN — Medication 1 TABLET(S): at 17:39

## 2017-04-28 RX ADMIN — SODIUM CHLORIDE 75 MILLILITER(S): 9 INJECTION, SOLUTION INTRAVENOUS at 21:19

## 2017-04-28 RX ADMIN — LATANOPROST 1 DROP(S): 0.05 SOLUTION/ DROPS OPHTHALMIC; TOPICAL at 21:19

## 2017-04-28 RX ADMIN — ENOXAPARIN SODIUM 40 MILLIGRAM(S): 100 INJECTION SUBCUTANEOUS at 12:36

## 2017-04-29 LAB
ALBUMIN SERPL ELPH-MCNC: 2.4 G/DL — LOW (ref 3.3–5)
ALP SERPL-CCNC: 194 U/L — HIGH (ref 40–120)
ALT FLD-CCNC: 22 U/L — SIGNIFICANT CHANGE UP (ref 4–33)
AST SERPL-CCNC: 16 U/L — SIGNIFICANT CHANGE UP (ref 4–32)
BACTERIA BLD CULT: SIGNIFICANT CHANGE UP
BILIRUB SERPL-MCNC: 0.7 MG/DL — SIGNIFICANT CHANGE UP (ref 0.2–1.2)
BUN SERPL-MCNC: 16 MG/DL — SIGNIFICANT CHANGE UP (ref 7–23)
CALCIUM SERPL-MCNC: 9.1 MG/DL — SIGNIFICANT CHANGE UP (ref 8.4–10.5)
CHLORIDE SERPL-SCNC: 104 MMOL/L — SIGNIFICANT CHANGE UP (ref 98–107)
CO2 SERPL-SCNC: 21 MMOL/L — LOW (ref 22–31)
CREAT SERPL-MCNC: 1.12 MG/DL — SIGNIFICANT CHANGE UP (ref 0.5–1.3)
GLUCOSE SERPL-MCNC: 178 MG/DL — HIGH (ref 70–99)
HCT VFR BLD CALC: 32 % — LOW (ref 34.5–45)
HGB BLD-MCNC: 10.4 G/DL — LOW (ref 11.5–15.5)
LIDOCAIN IGE QN: 24.8 U/L — SIGNIFICANT CHANGE UP (ref 7–60)
MCHC RBC-ENTMCNC: 30 PG — SIGNIFICANT CHANGE UP (ref 27–34)
MCHC RBC-ENTMCNC: 32.5 % — SIGNIFICANT CHANGE UP (ref 32–36)
MCV RBC AUTO: 92.2 FL — SIGNIFICANT CHANGE UP (ref 80–100)
PLATELET # BLD AUTO: 549 K/UL — HIGH (ref 150–400)
PMV BLD: 9.2 FL — SIGNIFICANT CHANGE UP (ref 7–13)
POTASSIUM SERPL-MCNC: 4.6 MMOL/L — SIGNIFICANT CHANGE UP (ref 3.5–5.3)
POTASSIUM SERPL-SCNC: 4.6 MMOL/L — SIGNIFICANT CHANGE UP (ref 3.5–5.3)
PROT SERPL-MCNC: 5.9 G/DL — LOW (ref 6–8.3)
RBC # BLD: 3.47 M/UL — LOW (ref 3.8–5.2)
RBC # FLD: 15.8 % — HIGH (ref 10.3–14.5)
SODIUM SERPL-SCNC: 138 MMOL/L — SIGNIFICANT CHANGE UP (ref 135–145)
WBC # BLD: 14.53 K/UL — HIGH (ref 3.8–10.5)
WBC # FLD AUTO: 14.53 K/UL — HIGH (ref 3.8–10.5)

## 2017-04-29 RX ADMIN — Medication 1 TABLET(S): at 17:06

## 2017-04-29 RX ADMIN — AMLODIPINE BESYLATE 5 MILLIGRAM(S): 2.5 TABLET ORAL at 05:47

## 2017-04-29 RX ADMIN — ENOXAPARIN SODIUM 40 MILLIGRAM(S): 100 INJECTION SUBCUTANEOUS at 12:14

## 2017-04-29 RX ADMIN — SODIUM CHLORIDE 75 MILLILITER(S): 9 INJECTION, SOLUTION INTRAVENOUS at 17:06

## 2017-04-29 RX ADMIN — LATANOPROST 1 DROP(S): 0.05 SOLUTION/ DROPS OPHTHALMIC; TOPICAL at 21:39

## 2017-04-29 RX ADMIN — ATENOLOL 25 MILLIGRAM(S): 25 TABLET ORAL at 05:47

## 2017-04-29 RX ADMIN — Medication 1 DROP(S): at 12:14

## 2017-04-29 RX ADMIN — Medication 1 TABLET(S): at 05:47

## 2017-04-29 RX ADMIN — SODIUM CHLORIDE 75 MILLILITER(S): 9 INJECTION, SOLUTION INTRAVENOUS at 21:39

## 2017-04-30 LAB
ALBUMIN SERPL ELPH-MCNC: 2.6 G/DL — LOW (ref 3.3–5)
ALP SERPL-CCNC: 174 U/L — HIGH (ref 40–120)
ALT FLD-CCNC: 23 U/L — SIGNIFICANT CHANGE UP (ref 4–33)
AST SERPL-CCNC: 15 U/L — SIGNIFICANT CHANGE UP (ref 4–32)
BILIRUB SERPL-MCNC: 0.6 MG/DL — SIGNIFICANT CHANGE UP (ref 0.2–1.2)
BUN SERPL-MCNC: 15 MG/DL — SIGNIFICANT CHANGE UP (ref 7–23)
CALCIUM SERPL-MCNC: 8.8 MG/DL — SIGNIFICANT CHANGE UP (ref 8.4–10.5)
CHLORIDE SERPL-SCNC: 105 MMOL/L — SIGNIFICANT CHANGE UP (ref 98–107)
CO2 SERPL-SCNC: 22 MMOL/L — SIGNIFICANT CHANGE UP (ref 22–31)
CREAT SERPL-MCNC: 1.16 MG/DL — SIGNIFICANT CHANGE UP (ref 0.5–1.3)
GLUCOSE SERPL-MCNC: 105 MG/DL — HIGH (ref 70–99)
HCT VFR BLD CALC: 31.7 % — LOW (ref 34.5–45)
HGB BLD-MCNC: 10.1 G/DL — LOW (ref 11.5–15.5)
MAGNESIUM SERPL-MCNC: 1.8 MG/DL — SIGNIFICANT CHANGE UP (ref 1.6–2.6)
MCHC RBC-ENTMCNC: 29.5 PG — SIGNIFICANT CHANGE UP (ref 27–34)
MCHC RBC-ENTMCNC: 31.9 % — LOW (ref 32–36)
MCV RBC AUTO: 92.7 FL — SIGNIFICANT CHANGE UP (ref 80–100)
PHOSPHATE SERPL-MCNC: 2.8 MG/DL — SIGNIFICANT CHANGE UP (ref 2.5–4.5)
PLATELET # BLD AUTO: 548 K/UL — HIGH (ref 150–400)
PMV BLD: 8.8 FL — SIGNIFICANT CHANGE UP (ref 7–13)
POTASSIUM SERPL-MCNC: 4.2 MMOL/L — SIGNIFICANT CHANGE UP (ref 3.5–5.3)
POTASSIUM SERPL-SCNC: 4.2 MMOL/L — SIGNIFICANT CHANGE UP (ref 3.5–5.3)
PROT SERPL-MCNC: 5.9 G/DL — LOW (ref 6–8.3)
RBC # BLD: 3.42 M/UL — LOW (ref 3.8–5.2)
RBC # FLD: 15.8 % — HIGH (ref 10.3–14.5)
SODIUM SERPL-SCNC: 139 MMOL/L — SIGNIFICANT CHANGE UP (ref 135–145)
WBC # BLD: 16.67 K/UL — HIGH (ref 3.8–10.5)
WBC # FLD AUTO: 16.67 K/UL — HIGH (ref 3.8–10.5)

## 2017-04-30 PROCEDURE — 74177 CT ABD & PELVIS W/CONTRAST: CPT | Mod: 26

## 2017-04-30 RX ORDER — MORPHINE SULFATE 50 MG/1
2 CAPSULE, EXTENDED RELEASE ORAL ONCE
Qty: 0 | Refills: 0 | Status: DISCONTINUED | OUTPATIENT
Start: 2017-04-30 | End: 2017-04-30

## 2017-04-30 RX ORDER — SODIUM CHLORIDE 9 MG/ML
1000 INJECTION INTRAMUSCULAR; INTRAVENOUS; SUBCUTANEOUS ONCE
Qty: 0 | Refills: 0 | Status: COMPLETED | OUTPATIENT
Start: 2017-04-30 | End: 2017-04-30

## 2017-04-30 RX ORDER — MAGNESIUM SULFATE 500 MG/ML
2 VIAL (ML) INJECTION ONCE
Qty: 0 | Refills: 0 | Status: COMPLETED | OUTPATIENT
Start: 2017-04-30 | End: 2017-04-30

## 2017-04-30 RX ADMIN — SODIUM CHLORIDE 100 MILLILITER(S): 9 INJECTION, SOLUTION INTRAVENOUS at 21:24

## 2017-04-30 RX ADMIN — LATANOPROST 1 DROP(S): 0.05 SOLUTION/ DROPS OPHTHALMIC; TOPICAL at 21:24

## 2017-04-30 RX ADMIN — SODIUM CHLORIDE 1000 MILLILITER(S): 9 INJECTION INTRAMUSCULAR; INTRAVENOUS; SUBCUTANEOUS at 17:17

## 2017-04-30 RX ADMIN — Medication 1 DROP(S): at 11:29

## 2017-04-30 RX ADMIN — Medication 1 TABLET(S): at 17:15

## 2017-04-30 RX ADMIN — SODIUM CHLORIDE 100 MILLILITER(S): 9 INJECTION, SOLUTION INTRAVENOUS at 10:49

## 2017-04-30 RX ADMIN — Medication 1 TABLET(S): at 05:37

## 2017-04-30 RX ADMIN — ENOXAPARIN SODIUM 40 MILLIGRAM(S): 100 INJECTION SUBCUTANEOUS at 11:28

## 2017-04-30 RX ADMIN — Medication 50 GRAM(S): at 12:35

## 2017-04-30 RX ADMIN — AMLODIPINE BESYLATE 5 MILLIGRAM(S): 2.5 TABLET ORAL at 05:37

## 2017-04-30 NOTE — PROVIDER CONTACT NOTE (OTHER) - ASSESSMENT
Bladder scanner done 260cc of urine showed
pt reports 5/10 left aching shoulder pain; denies chest pain, SOB, /67, HR 62

## 2017-05-01 PROCEDURE — 99232 SBSQ HOSP IP/OBS MODERATE 35: CPT | Mod: GC

## 2017-05-01 RX ADMIN — Medication 63.75 MILLIMOLE(S): at 11:55

## 2017-05-01 RX ADMIN — Medication 1 DROP(S): at 11:56

## 2017-05-01 RX ADMIN — AMLODIPINE BESYLATE 5 MILLIGRAM(S): 2.5 TABLET ORAL at 05:12

## 2017-05-01 RX ADMIN — Medication 1 TABLET(S): at 05:12

## 2017-05-01 RX ADMIN — ATENOLOL 25 MILLIGRAM(S): 25 TABLET ORAL at 05:12

## 2017-05-01 RX ADMIN — LATANOPROST 1 DROP(S): 0.05 SOLUTION/ DROPS OPHTHALMIC; TOPICAL at 21:45

## 2017-05-01 RX ADMIN — ENOXAPARIN SODIUM 40 MILLIGRAM(S): 100 INJECTION SUBCUTANEOUS at 11:56

## 2017-05-01 NOTE — PROVIDER CONTACT NOTE (OTHER) - SITUATION
Patient did not void overnight. Bladder scan done showed >260cc of urine
Patient is ordered for ua/ucx midstream.  Patient is incontinent.  Attempted to toilet patient throughout the night with bedpan but patient has not voided in bedpan was only incontinent.
Patient with left shoulder pain 5/10

## 2017-05-01 NOTE — PROVIDER CONTACT NOTE (OTHER) - BACKGROUND
s/p Right IR percutaneous drain placement; s/p EGD
Patient is s/p cholecystostomy, ERCP.
gallstone pancreatitis; s/p IR tube placement  reports hx of arthritis

## 2017-05-01 NOTE — DIETITIAN INITIAL EVALUATION ADULT. - OTHER INFO
Nutrition assessment completed for length of stay; admitted for gallstone pancreatitis s/p MRCP. Met with patient, reports she is eating a lot better with 100% of breakfast this morning. Denies food allergies, GI distress (nausea/vomiting/constipation/diarrhea), or issues with chewing/swallowing. She reports UBW ranges between 110 to 120# without any significant changes. Patient had no nutrition-related questions or concerns for dietitian at this time.

## 2017-05-01 NOTE — DIETITIAN INITIAL EVALUATION ADULT. - NS AS NUTRI INTERV MEALS SNACK
Continue Soft diet as tolerated which remains appropriate and adequate at this time/General/healthful diet

## 2017-05-01 NOTE — PROVIDER CONTACT NOTE (OTHER) - ACTION/TREATMENT ORDERED:
MD states to straight cath patient for ua/ucx.  Encouraged MD to provide an order to straight cath patient.  Will continue to monitor patient.
No interventions at this time. Will await for patient to urinate.
repositioned patient, awaiting orders; will continue to monitor

## 2017-05-02 VITALS
RESPIRATION RATE: 18 BRPM | OXYGEN SATURATION: 100 % | SYSTOLIC BLOOD PRESSURE: 163 MMHG | DIASTOLIC BLOOD PRESSURE: 68 MMHG | HEART RATE: 64 BPM | TEMPERATURE: 97 F

## 2017-05-02 LAB
BACTERIA UR CULT: SIGNIFICANT CHANGE UP
SPECIMEN SOURCE: SIGNIFICANT CHANGE UP

## 2017-05-02 PROCEDURE — 99223 1ST HOSP IP/OBS HIGH 75: CPT

## 2017-05-02 PROCEDURE — 76856 US EXAM PELVIC COMPLETE: CPT | Mod: 26

## 2017-05-02 RX ORDER — HYDRALAZINE HCL 50 MG
10 TABLET ORAL ONCE
Qty: 0 | Refills: 0 | Status: COMPLETED | OUTPATIENT
Start: 2017-05-02 | End: 2017-05-02

## 2017-05-02 RX ORDER — METOPROLOL TARTRATE 50 MG
25 TABLET ORAL ONCE
Qty: 0 | Refills: 0 | Status: DISCONTINUED | OUTPATIENT
Start: 2017-05-02 | End: 2017-05-02

## 2017-05-02 RX ADMIN — Medication 10 MILLIGRAM(S): at 19:15

## 2017-05-02 RX ADMIN — ATENOLOL 25 MILLIGRAM(S): 25 TABLET ORAL at 05:37

## 2017-05-02 RX ADMIN — AMLODIPINE BESYLATE 5 MILLIGRAM(S): 2.5 TABLET ORAL at 05:37

## 2017-05-02 RX ADMIN — Medication 1 DROP(S): at 11:33

## 2017-05-02 RX ADMIN — ENOXAPARIN SODIUM 40 MILLIGRAM(S): 100 INJECTION SUBCUTANEOUS at 11:33

## 2017-05-02 NOTE — DISCHARGE NOTE ADULT - MEDICATION SUMMARY - MEDICATIONS TO TAKE
I will START or STAY ON the medications listed below when I get home from the hospital:    atenolol 25 mg oral tablet  -- 1 tab(s) by mouth once a day  -- Indication: For hypertension    amLODIPine 5 mg oral tablet  -- 1 tab(s) by mouth once a day  -- Indication: For hypertension    hydrochlorothiazide 25 mg oral tablet  -- 1 tab(s) by mouth once a day  -- Avoid prolonged or excessive exposure to direct and/or artificial sunlight while taking this medication.  It is very important that you take or use this exactly as directed.  Do not skip doses or discontinue unless directed by your doctor.  It may be advisable to drink a full glass orange juice or eat a banana daily while taking this medication.  Take with food or milk.    -- Indication: For hypertension    Xalatan 0.005% ophthalmic solution  -- 1 drop(s) to each affected eye once a day (in the evening)  -- Indication: For eye drops    timolol maleate 0.5% ophthalmic gel forming solution  -- 1 drop(s) to each affected eye once a day  -- Indication: For eye drops

## 2017-05-02 NOTE — DISCHARGE NOTE ADULT - CARE PROVIDERS DIRECT ADDRESSES
[email or web address]

## 2017-05-02 NOTE — DISCHARGE NOTE ADULT - HOSPITAL COURSE
88 year old woman presented to the ED (brought in by her son) complaining of 3 days of RUQ pain. No nausea, vomiting, fever, chills. She walks with a walker but lately she states she couldn't walk. She was afebrile in the ED however she had a WBC count of 25. CT of the abdomen and pelvis showed a distended gallbladder with gall stones and prominent walls concerning for acute cholecystitis. Ultrasound showed cholelithiasis and biliary sludge. HIDA was consistent with diagnosis of acute cholecystitis. Elevated amylase and lipase were indicative of gallstone pancreatitis.  MRCP was performed to evaluate for dilation of CBD. It revealed perforated cholecystitis. The patient underwent percutaneous cholecystostomy tube followed by ERCP with removal of stones and pus and a sphincterotomy. The patient's WBC count remained elevated and so a CT of the abdomen and pelvis were performed where findings of abnormal endometrium concerning for uterine cancer were found. Gynecology was consulted and recommended transvaginal ultrasound. 88 year old woman presented to the ED (brought in by her son) complaining of 3 days of RUQ pain. No nausea, vomiting, fever, chills. She walks with a walker but lately she states she couldn't walk. She was afebrile in the ED however she had a WBC count of 25. CT of the abdomen and pelvis showed a distended gallbladder with gall stones and prominent walls concerning for acute cholecystitis. Ultrasound showed cholelithiasis and biliary sludge. HIDA was consistent with diagnosis of acute cholecystitis. Elevated amylase and lipase were indicative of gallstone pancreatitis.  MRCP was performed to evaluate for dilation of CBD. It revealed perforated cholecystitis. The patient underwent percutaneous cholecystostomy tube followed by ERCP with removal of stones and pus and a sphincterotomy. The patient's WBC count remained elevated and so a CT of the abdomen and pelvis were performed where findings of abnormal endometrium concerning for uterine cancer were found. Gynecology was consulted and recommended transvaginal ultrasound.   ..... Course of antibiotics finished and EBC normalized. 88 year old woman presented to the ED (brought in by her son) complaining of 3 days of RUQ pain. No nausea, vomiting, fever, chills. She walks with a walker but lately she states she couldn't walk. She was afebrile in the ED however she had a WBC count of 25. CT of the abdomen and pelvis showed a distended gallbladder with gall stones and prominent walls concerning for acute cholecystitis. Ultrasound showed cholelithiasis and biliary sludge. HIDA was consistent with diagnosis of acute cholecystitis. Elevated amylase and lipase were indicative of gallstone pancreatitis.  MRCP was performed to evaluate for dilation of CBD. It revealed perforated cholecystitis. The patient underwent percutaneous cholecystostomy tube followed by ERCP with removal of stones and pus and a sphincterotomy. The patient's WBC count remained elevated and so a CT of the abdomen and pelvis were performed where findings of abnormal endometrium concerning for uterine cancer were found. Gynecology was consulted and recommended transvaginal ultrasound. After discussions with the daughter, since no GYN surgical intervention would be pursued, the ultrasound was cancelled. Course of antibiotics finished and WBC normalized.  At the time of discharge pt was tolerating soft low residue diet.  Pt discharged to rehab.

## 2017-05-02 NOTE — DISCHARGE NOTE ADULT - CARE PROVIDER_API CALL
Rian Ugalde), DieticianNutrition; Surgery; Surgical Critical Care  [address] [place] [address]  Phone: [phone]  Fax: [phone] Rian Ugalde), DieticianNutrition; Surgery; Surgical Critical Care  [address] [place] [address]  Phone: [phone]  Fax: [phone]    Malick Collazo), Gastroenterology; Internal Medicine  [address] [place] [address]  Phone: [phone]  Fax: [phone]

## 2017-05-02 NOTE — DISCHARGE NOTE ADULT - CARE PLAN
Principal Discharge DX:	Gallstone pancreatitis  Goal:	s/p perc vince tube and ERCP with sphincterotomy  Instructions for follow-up, activity and diet:	Please continue a low fat diet. The patient may resume a regular activity. Take medications as prescribed. Principal Discharge DX:	Gallstone pancreatitis  Goal:	s/p perc vince tube and ERCP with sphincterotomy  Instructions for follow-up, activity and diet:	Please continue a low fat diet. The patient may resume a regular activity. Take medications as prescribed.  Keep drain site clean and dry.  If you note a change in color of drain out put or experience increased pain please call Dr. Ugalde's office.  Please call [phone] to make a follow up appointment with Dr. Ugalde in one week.  Follow up with Gastroenterologist Dr. Collazo if needed. Principal Discharge DX:	Gallstone pancreatitis  Goal:	s/p perc vince tube and ERCP with sphincterotomy  Instructions for follow-up, activity and diet:	Please continue a low fat diet. The patient may resume a regular activity. Take medications as prescribed.  Keep drain site clean and dry.  If you note a change in color of drain out put or experience increased pain please call Dr. Ugalde's office.  Please call [phone] to make a follow up appointment with Dr. Ugalde in one week.  Follow up with Gastroenterologist Dr. Collazo if needed. Principal Discharge DX:	Gallstone pancreatitis  Goal:	s/p perc vince tube and ERCP with sphincterotomy  Instructions for follow-up, activity and diet:	Please continue a low fat diet. The patient may resume a regular activity. Take medications as prescribed.  Keep drain site clean and dry.  If you note a change in color of drain out put or experience increased pain please call Dr. Ugalde's office.  Please call [phone] to make a follow up appointment with Dr. Ugalde in one week.  Follow up with Gastroenterologist Dr. Collazo if needed. Principal Discharge DX:	Gallstone pancreatitis  Goal:	s/p perc vince tube and ERCP with sphincterotomy  Instructions for follow-up, activity and diet:	Please continue a low fat diet. The patient may resume a regular activity. Take medications as prescribed.  Keep drain site clean and dry.  If you note a change in color of drain out put or experience increased pain please call Dr. Ugalde's office.  Please call [phone] to make a follow up appointment with Dr. Ugalde in one week.  Follow up with Gastroenterologist Dr. Collazo if needed.

## 2017-05-02 NOTE — DISCHARGE NOTE ADULT - PLAN OF CARE
s/p perc vince tube and ERCP with sphincterotomy Please continue a low fat diet. The patient may resume a regular activity. Take medications as prescribed. Please continue a low fat diet. The patient may resume a regular activity. Take medications as prescribed.  Keep drain site clean and dry.  If you note a change in color of drain out put or experience increased pain please call Dr. Ugalde's office.  Please call [phone] to make a follow up appointment with Dr. Ugalde in one week.  Follow up with Gastroenterologist Dr. Collazo if needed.

## 2017-05-02 NOTE — DISCHARGE NOTE ADULT - PATIENT PORTAL LINK FT
“You can access the Follow[employer] Patient Portal, offered by [facility], by registering with the following website: [email or web address]”

## 2017-05-03 LAB — SURGICAL PATHOLOGY STUDY: SIGNIFICANT CHANGE UP

## 2017-05-24 PROBLEM — Z00.00 ENCOUNTER FOR PREVENTIVE HEALTH EXAMINATION: Status: ACTIVE | Noted: 2017-05-24

## 2017-05-24 LAB — FUNGUS SPEC QL CULT: SIGNIFICANT CHANGE UP

## 2017-06-05 ENCOUNTER — APPOINTMENT (OUTPATIENT)
Dept: TRAUMA SURGERY | Facility: CLINIC | Age: 82
End: 2017-06-05

## 2017-06-05 VITALS
HEIGHT: 64 IN | DIASTOLIC BLOOD PRESSURE: 62 MMHG | TEMPERATURE: 98.4 F | SYSTOLIC BLOOD PRESSURE: 130 MMHG | BODY MASS INDEX: 24.75 KG/M2 | WEIGHT: 145 LBS | HEART RATE: 49 BPM

## 2017-06-05 DIAGNOSIS — Z82.49 FAMILY HISTORY OF ISCHEMIC HEART DISEASE AND OTHER DISEASES OF THE CIRCULATORY SYSTEM: ICD-10-CM

## 2017-06-05 DIAGNOSIS — Z86.79 PERSONAL HISTORY OF OTHER DISEASES OF THE CIRCULATORY SYSTEM: ICD-10-CM

## 2017-06-14 ENCOUNTER — APPOINTMENT (OUTPATIENT)
Dept: HOME HEALTH SERVICES | Facility: HOME HEALTH | Age: 82
End: 2017-06-14

## 2017-06-14 VITALS
DIASTOLIC BLOOD PRESSURE: 60 MMHG | RESPIRATION RATE: 18 BRPM | BODY MASS INDEX: 24.75 KG/M2 | HEIGHT: 64 IN | WEIGHT: 145 LBS | SYSTOLIC BLOOD PRESSURE: 108 MMHG | TEMPERATURE: 98 F | HEART RATE: 41 BPM | OXYGEN SATURATION: 98 %

## 2017-06-14 RX ORDER — AMLODIPINE BESYLATE 5 MG/1
TABLET ORAL
Refills: 0 | Status: DISCONTINUED | COMMUNITY
End: 2017-06-14

## 2017-06-16 LAB
25(OH)D3 SERPL-MCNC: 26.9 NG/ML
ALBUMIN SERPL ELPH-MCNC: 3.5 G/DL
ALP BLD-CCNC: 83 U/L
ALT SERPL-CCNC: 10 U/L
ANION GAP SERPL CALC-SCNC: 21 MMOL/L
AST SERPL-CCNC: 14 U/L
BASOPHILS # BLD AUTO: 0.04 K/UL
BASOPHILS NFR BLD AUTO: 0.5 %
BILIRUB SERPL-MCNC: 0.4 MG/DL
BUN SERPL-MCNC: 39 MG/DL
CALCIUM SERPL-MCNC: 10.7 MG/DL
CHLORIDE SERPL-SCNC: 101 MMOL/L
CHOLEST SERPL-MCNC: 188 MG/DL
CHOLEST/HDLC SERPL: 4 RATIO
CO2 SERPL-SCNC: 21 MMOL/L
CREAT SERPL-MCNC: 1.25 MG/DL
EOSINOPHIL # BLD AUTO: 0.24 K/UL
EOSINOPHIL NFR BLD AUTO: 3.1 %
FOLATE SERPL-MCNC: >20 NG/ML
GLUCOSE SERPL-MCNC: 69 MG/DL
HBA1C MFR BLD HPLC: 5.8 %
HCT VFR BLD CALC: 36.9 %
HDLC SERPL-MCNC: 47 MG/DL
HGB BLD-MCNC: 12.1 G/DL
IMM GRANULOCYTES NFR BLD AUTO: 0.1 %
LDLC SERPL CALC-MCNC: 108 MG/DL
LYMPHOCYTES # BLD AUTO: 2.75 K/UL
LYMPHOCYTES NFR BLD AUTO: 35 %
MAN DIFF?: NORMAL
MCHC RBC-ENTMCNC: 30.3 PG
MCHC RBC-ENTMCNC: 32.8 GM/DL
MCV RBC AUTO: 92.3 FL
MONOCYTES # BLD AUTO: 0.48 K/UL
MONOCYTES NFR BLD AUTO: 6.1 %
NEUTROPHILS # BLD AUTO: 4.34 K/UL
NEUTROPHILS NFR BLD AUTO: 55.2 %
PLATELET # BLD AUTO: 530 K/UL
POTASSIUM SERPL-SCNC: 4.4 MMOL/L
PROT SERPL-MCNC: 6.9 G/DL
RBC # BLD: 4 M/UL
RBC # FLD: 14.6 %
SODIUM SERPL-SCNC: 143 MMOL/L
TRIGL SERPL-MCNC: 165 MG/DL
TSH SERPL-ACNC: 2.09 UIU/ML
VIT B12 SERPL-MCNC: 453 PG/ML
WBC # FLD AUTO: 7.86 K/UL

## 2017-06-20 ENCOUNTER — MEDICATION RENEWAL (OUTPATIENT)
Age: 82
End: 2017-06-20

## 2017-07-05 ENCOUNTER — APPOINTMENT (OUTPATIENT)
Dept: HOME HEALTH SERVICES | Facility: HOME HEALTH | Age: 82
End: 2017-07-05

## 2017-07-05 ENCOUNTER — OTHER (OUTPATIENT)
Age: 82
End: 2017-07-05

## 2017-07-06 ENCOUNTER — OTHER (OUTPATIENT)
Age: 82
End: 2017-07-06

## 2017-07-11 ENCOUNTER — APPOINTMENT (OUTPATIENT)
Dept: HOME HEALTH SERVICES | Facility: HOME HEALTH | Age: 82
End: 2017-07-11

## 2017-07-11 VITALS
OXYGEN SATURATION: 98 % | SYSTOLIC BLOOD PRESSURE: 102 MMHG | HEIGHT: 64 IN | DIASTOLIC BLOOD PRESSURE: 60 MMHG | RESPIRATION RATE: 18 BRPM | HEART RATE: 54 BPM | TEMPERATURE: 97 F | BODY MASS INDEX: 24.75 KG/M2 | WEIGHT: 145 LBS

## 2017-07-11 DIAGNOSIS — R32 UNSPECIFIED URINARY INCONTINENCE: ICD-10-CM

## 2017-07-11 RX ORDER — ATENOLOL 25 MG/1
25 TABLET ORAL DAILY
Qty: 90 | Refills: 3 | Status: DISCONTINUED | COMMUNITY
Start: 2017-06-14 | End: 2017-07-11

## 2017-07-21 ENCOUNTER — CLINICAL ADVICE (OUTPATIENT)
Age: 82
End: 2017-07-21

## 2017-07-21 DIAGNOSIS — K59.00 CONSTIPATION, UNSPECIFIED: ICD-10-CM

## 2017-07-24 ENCOUNTER — APPOINTMENT (OUTPATIENT)
Dept: TRAUMA SURGERY | Facility: CLINIC | Age: 82
End: 2017-07-24

## 2017-07-24 VITALS
HEART RATE: 57 BPM | WEIGHT: 145 LBS | BODY MASS INDEX: 24.75 KG/M2 | RESPIRATION RATE: 17 BRPM | HEIGHT: 64 IN | SYSTOLIC BLOOD PRESSURE: 130 MMHG | DIASTOLIC BLOOD PRESSURE: 70 MMHG | TEMPERATURE: 98.3 F

## 2017-07-26 ENCOUNTER — OTHER (OUTPATIENT)
Age: 82
End: 2017-07-26

## 2017-08-01 ENCOUNTER — OTHER (OUTPATIENT)
Age: 82
End: 2017-08-01

## 2017-08-02 ENCOUNTER — OUTPATIENT (OUTPATIENT)
Dept: OUTPATIENT SERVICES | Facility: HOSPITAL | Age: 82
LOS: 1 days | End: 2017-08-02
Payer: MEDICARE

## 2017-08-02 DIAGNOSIS — K81.9 CHOLECYSTITIS, UNSPECIFIED: ICD-10-CM

## 2017-08-02 PROCEDURE — 47536 EXCHANGE BILIARY DRG CATH: CPT

## 2017-08-07 DIAGNOSIS — K80.00 CALCULUS OF GALLBLADDER WITH ACUTE CHOLECYSTITIS WITHOUT OBSTRUCTION: ICD-10-CM

## 2017-08-07 DIAGNOSIS — Z43.4 ENCOUNTER FOR ATTENTION TO OTHER ARTIFICIAL OPENINGS OF DIGESTIVE TRACT: ICD-10-CM

## 2017-08-08 ENCOUNTER — OTHER (OUTPATIENT)
Age: 82
End: 2017-08-08

## 2017-08-14 PROBLEM — R32 URINARY INCONTINENCE: Status: ACTIVE | Noted: 2017-08-14

## 2017-09-18 ENCOUNTER — APPOINTMENT (OUTPATIENT)
Dept: HOME HEALTH SERVICES | Facility: HOME HEALTH | Age: 82
End: 2017-09-18

## 2017-09-22 ENCOUNTER — APPOINTMENT (OUTPATIENT)
Dept: HOME HEALTH SERVICES | Facility: HOME HEALTH | Age: 82
End: 2017-09-22
Payer: MEDICARE

## 2017-09-22 VITALS
TEMPERATURE: 97 F | BODY MASS INDEX: 24.75 KG/M2 | HEIGHT: 64 IN | WEIGHT: 145 LBS | OXYGEN SATURATION: 98 % | RESPIRATION RATE: 18 BRPM | DIASTOLIC BLOOD PRESSURE: 60 MMHG | SYSTOLIC BLOOD PRESSURE: 116 MMHG | HEART RATE: 50 BPM

## 2017-09-22 DIAGNOSIS — N75.0 CYST OF BARTHOLIN'S GLAND: ICD-10-CM

## 2017-09-22 PROCEDURE — 99349 HOME/RES VST EST MOD MDM 40: CPT

## 2017-10-18 ENCOUNTER — APPOINTMENT (OUTPATIENT)
Dept: HOME HEALTH SERVICES | Facility: HOME HEALTH | Age: 82
End: 2017-10-18

## 2017-10-18 VITALS
RESPIRATION RATE: 18 BRPM | OXYGEN SATURATION: 98 % | SYSTOLIC BLOOD PRESSURE: 134 MMHG | DIASTOLIC BLOOD PRESSURE: 82 MMHG | HEART RATE: 56 BPM | TEMPERATURE: 97.6 F

## 2017-10-18 RX ORDER — PNV NO.95/FERROUS FUM/FOLIC AC 28MG-0.8MG
TABLET ORAL
Qty: 90 | Refills: 3 | Status: ACTIVE | COMMUNITY
Start: 2017-06-14

## 2017-10-18 RX ORDER — DOCUSATE SODIUM 100 MG/1
100 CAPSULE ORAL
Qty: 270 | Refills: 3 | Status: ACTIVE | COMMUNITY
Start: 2017-07-21

## 2017-10-24 ENCOUNTER — APPOINTMENT (OUTPATIENT)
Dept: HOME HEALTH SERVICES | Facility: HOME HEALTH | Age: 82
End: 2017-10-24

## 2017-11-10 ENCOUNTER — APPOINTMENT (OUTPATIENT)
Dept: HOME HEALTH SERVICES | Facility: HOME HEALTH | Age: 82
End: 2017-11-10

## 2017-11-13 ENCOUNTER — APPOINTMENT (OUTPATIENT)
Dept: HOME HEALTH SERVICES | Facility: HOME HEALTH | Age: 82
End: 2017-11-13
Payer: MEDICARE

## 2017-11-13 VITALS
RESPIRATION RATE: 18 BRPM | OXYGEN SATURATION: 96 % | WEIGHT: 146 LBS | BODY MASS INDEX: 24.92 KG/M2 | HEIGHT: 64 IN | HEART RATE: 65 BPM | TEMPERATURE: 97 F | DIASTOLIC BLOOD PRESSURE: 70 MMHG | SYSTOLIC BLOOD PRESSURE: 132 MMHG

## 2017-11-13 DIAGNOSIS — Z43.4 ENCOUNTER FOR ATTENTION TO OTHER ARTIFICIAL OPENINGS OF DIGESTIVE TRACT: ICD-10-CM

## 2017-11-13 PROCEDURE — 90686 IIV4 VACC NO PRSV 0.5 ML IM: CPT

## 2017-11-13 PROCEDURE — 99350 HOME/RES VST EST HIGH MDM 60: CPT | Mod: 25

## 2017-11-13 PROCEDURE — G0008: CPT

## 2017-11-13 NOTE — PATIENT PROFILE ADULT. - PATIENT REPRESENTATIVE: ( YOU CAN CHOOSE ANY PERSON THAT CAN ASSIST YOU WITH YOUR HEALTH CARE PREFERENCES, DOES NOT HAVE TO BE A SPOUSE, IMMEDIATE FAMILY OR SIGNIFICANT OTHER/PARTNER)
Information could not be obtained I have reviewed and confirmed nurses' notes for patient's medications, allergies, medical history, and surgical history.

## 2017-11-17 ENCOUNTER — APPOINTMENT (OUTPATIENT)
Dept: HOME HEALTH SERVICES | Facility: HOME HEALTH | Age: 82
End: 2017-11-17

## 2017-11-21 LAB
25(OH)D3 SERPL-MCNC: 37.9 NG/ML
ALBUMIN SERPL ELPH-MCNC: 3.9 G/DL
ALP BLD-CCNC: 83 U/L
ALT SERPL-CCNC: 17 U/L
ANION GAP SERPL CALC-SCNC: 14 MMOL/L
AST SERPL-CCNC: 18 U/L
BASOPHILS # BLD AUTO: 0.02 K/UL
BASOPHILS NFR BLD AUTO: 0.3 %
BILIRUB SERPL-MCNC: 0.6 MG/DL
BUN SERPL-MCNC: 35 MG/DL
CALCIUM SERPL-MCNC: 10.6 MG/DL
CHLORIDE SERPL-SCNC: 104 MMOL/L
CHOLEST SERPL-MCNC: 193 MG/DL
CHOLEST/HDLC SERPL: 2.4 RATIO
CO2 SERPL-SCNC: 25 MMOL/L
CREAT SERPL-MCNC: 1.09 MG/DL
EOSINOPHIL # BLD AUTO: 0.05 K/UL
EOSINOPHIL NFR BLD AUTO: 0.7 %
FOLATE SERPL-MCNC: >20 NG/ML
GLUCOSE SERPL-MCNC: 119 MG/DL
HBA1C MFR BLD HPLC: 5.6 %
HCT VFR BLD CALC: 38.4 %
HDLC SERPL-MCNC: 81 MG/DL
HGB BLD-MCNC: 12.5 G/DL
IMM GRANULOCYTES NFR BLD AUTO: 0.1 %
LDLC SERPL CALC-MCNC: 99 MG/DL
LYMPHOCYTES # BLD AUTO: 1.85 K/UL
LYMPHOCYTES NFR BLD AUTO: 24.8 %
MAN DIFF?: NORMAL
MCHC RBC-ENTMCNC: 30.3 PG
MCHC RBC-ENTMCNC: 32.6 GM/DL
MCV RBC AUTO: 93.2 FL
MONOCYTES # BLD AUTO: 0.7 K/UL
MONOCYTES NFR BLD AUTO: 9.4 %
NEUTROPHILS # BLD AUTO: 4.82 K/UL
NEUTROPHILS NFR BLD AUTO: 64.7 %
PLATELET # BLD AUTO: 324 K/UL
POTASSIUM SERPL-SCNC: 4.9 MMOL/L
PROT SERPL-MCNC: 7.2 G/DL
RBC # BLD: 4.12 M/UL
RBC # FLD: 14.8 %
SODIUM SERPL-SCNC: 143 MMOL/L
TRIGL SERPL-MCNC: 66 MG/DL
TSH SERPL-ACNC: 1.73 UIU/ML
URATE SERPL-MCNC: 8.2 MG/DL
VIT B12 SERPL-MCNC: 425 PG/ML
WBC # FLD AUTO: 7.45 K/UL

## 2017-12-01 ENCOUNTER — APPOINTMENT (OUTPATIENT)
Dept: HOME HEALTH SERVICES | Facility: HOME HEALTH | Age: 82
End: 2017-12-01
Payer: MEDICARE

## 2017-12-01 VITALS
DIASTOLIC BLOOD PRESSURE: 70 MMHG | OXYGEN SATURATION: 97 % | SYSTOLIC BLOOD PRESSURE: 158 MMHG | HEART RATE: 62 BPM | TEMPERATURE: 97 F | HEIGHT: 64 IN | WEIGHT: 148 LBS | RESPIRATION RATE: 18 BRPM | BODY MASS INDEX: 25.27 KG/M2

## 2017-12-01 DIAGNOSIS — R26.81 UNSTEADINESS ON FEET: ICD-10-CM

## 2017-12-01 PROCEDURE — 99349 HOME/RES VST EST MOD MDM 40: CPT

## 2018-01-22 ENCOUNTER — OTHER (OUTPATIENT)
Age: 83
End: 2018-01-22

## 2018-02-05 ENCOUNTER — MEDICATION RENEWAL (OUTPATIENT)
Age: 83
End: 2018-02-05

## 2018-02-05 RX ORDER — AMLODIPINE BESYLATE 10 MG/1
10 TABLET ORAL DAILY
Qty: 90 | Refills: 3 | Status: ACTIVE | COMMUNITY
Start: 2018-02-05 | End: 1900-01-01

## 2018-02-05 RX ORDER — AMLODIPINE BESYLATE 5 MG/1
5 TABLET ORAL DAILY
Qty: 90 | Refills: 3 | Status: DISCONTINUED | COMMUNITY
Start: 2017-06-14 | End: 2018-02-05

## 2018-02-13 ENCOUNTER — APPOINTMENT (OUTPATIENT)
Dept: HOME HEALTH SERVICES | Facility: HOME HEALTH | Age: 83
End: 2018-02-13

## 2018-03-14 ENCOUNTER — APPOINTMENT (OUTPATIENT)
Dept: HOME HEALTH SERVICES | Facility: HOME HEALTH | Age: 83
End: 2018-03-14
Payer: MEDICARE

## 2018-03-14 VITALS
DIASTOLIC BLOOD PRESSURE: 70 MMHG | OXYGEN SATURATION: 98 % | TEMPERATURE: 97 F | HEART RATE: 60 BPM | RESPIRATION RATE: 18 BRPM | SYSTOLIC BLOOD PRESSURE: 120 MMHG

## 2018-03-14 DIAGNOSIS — E53.8 DEFICIENCY OF OTHER SPECIFIED B GROUP VITAMINS: ICD-10-CM

## 2018-03-14 DIAGNOSIS — R39.81 FUNCTIONAL URINARY INCONTINENCE: ICD-10-CM

## 2018-03-14 PROCEDURE — 99349 HOME/RES VST EST MOD MDM 40: CPT

## 2018-03-20 LAB
25(OH)D3 SERPL-MCNC: 37 NG/ML
ALBUMIN SERPL ELPH-MCNC: 3.7 G/DL
ALP BLD-CCNC: 71 U/L
ALT SERPL-CCNC: 18 U/L
ANION GAP SERPL CALC-SCNC: 20 MMOL/L
AST SERPL-CCNC: 28 U/L
BASOPHILS # BLD AUTO: 0.02 K/UL
BASOPHILS NFR BLD AUTO: 0.3 %
BILIRUB SERPL-MCNC: 0.4 MG/DL
BUN SERPL-MCNC: 31 MG/DL
CALCIUM SERPL-MCNC: 9.9 MG/DL
CHLORIDE SERPL-SCNC: 104 MMOL/L
CHOLEST SERPL-MCNC: 194 MG/DL
CHOLEST/HDLC SERPL: 3.1 RATIO
CO2 SERPL-SCNC: 22 MMOL/L
CREAT SERPL-MCNC: 0.95 MG/DL
EOSINOPHIL # BLD AUTO: 0.17 K/UL
EOSINOPHIL NFR BLD AUTO: 2.2 %
FOLATE SERPL-MCNC: >20 NG/ML
GLUCOSE SERPL-MCNC: 110 MG/DL
HBA1C MFR BLD HPLC: 5.9 %
HCT VFR BLD CALC: 39.5 %
HDLC SERPL-MCNC: 63 MG/DL
HGB BLD-MCNC: 12.8 G/DL
IMM GRANULOCYTES NFR BLD AUTO: 0.3 %
LDLC SERPL CALC-MCNC: 107 MG/DL
LYMPHOCYTES # BLD AUTO: 2.32 K/UL
LYMPHOCYTES NFR BLD AUTO: 30.1 %
MAN DIFF?: NORMAL
MCHC RBC-ENTMCNC: 31 PG
MCHC RBC-ENTMCNC: 32.4 GM/DL
MCV RBC AUTO: 95.6 FL
MONOCYTES # BLD AUTO: 0.48 K/UL
MONOCYTES NFR BLD AUTO: 6.2 %
NEUTROPHILS # BLD AUTO: 4.71 K/UL
NEUTROPHILS NFR BLD AUTO: 60.9 %
PLATELET # BLD AUTO: 327 K/UL
POTASSIUM SERPL-SCNC: 4 MMOL/L
PROT SERPL-MCNC: 6.7 G/DL
RBC # BLD: 4.13 M/UL
RBC # FLD: 15.4 %
SODIUM SERPL-SCNC: 146 MMOL/L
TRIGL SERPL-MCNC: 122 MG/DL
TSH SERPL-ACNC: 2.2 UIU/ML
URATE SERPL-MCNC: 6.7 MG/DL
VIT B12 SERPL-MCNC: 518 PG/ML
WBC # FLD AUTO: 7.72 K/UL

## 2018-03-27 PROBLEM — R39.81 FUNCTIONAL INCONTINENCE: Status: ACTIVE | Noted: 2018-03-27

## 2018-04-09 ENCOUNTER — OTHER (OUTPATIENT)
Age: 83
End: 2018-04-09

## 2018-04-24 ENCOUNTER — APPOINTMENT (OUTPATIENT)
Dept: HOME HEALTH SERVICES | Facility: HOME HEALTH | Age: 83
End: 2018-04-24

## 2018-04-24 ENCOUNTER — CHART COPY (OUTPATIENT)
Age: 83
End: 2018-04-24

## 2018-05-01 ENCOUNTER — CLINICAL ADVICE (OUTPATIENT)
Age: 83
End: 2018-05-01

## 2018-05-02 ENCOUNTER — APPOINTMENT (OUTPATIENT)
Dept: HOME HEALTH SERVICES | Facility: HOME HEALTH | Age: 83
End: 2018-05-02
Payer: MEDICARE

## 2018-05-02 VITALS
DIASTOLIC BLOOD PRESSURE: 62 MMHG | HEIGHT: 64 IN | HEART RATE: 60 BPM | SYSTOLIC BLOOD PRESSURE: 110 MMHG | RESPIRATION RATE: 18 BRPM | TEMPERATURE: 97 F | OXYGEN SATURATION: 98 % | BODY MASS INDEX: 24.75 KG/M2 | WEIGHT: 145 LBS

## 2018-05-02 DIAGNOSIS — Z71.89 OTHER SPECIFIED COUNSELING: ICD-10-CM

## 2018-05-02 DIAGNOSIS — H40.89 OTHER SPECIFIED GLAUCOMA: ICD-10-CM

## 2018-05-02 PROCEDURE — 99349 HOME/RES VST EST MOD MDM 40: CPT

## 2018-05-05 ENCOUNTER — CHART COPY (OUTPATIENT)
Age: 83
End: 2018-05-05

## 2018-07-31 ENCOUNTER — APPOINTMENT (OUTPATIENT)
Dept: HOME HEALTH SERVICES | Facility: HOME HEALTH | Age: 83
End: 2018-07-31
Payer: MEDICARE

## 2018-07-31 VITALS
TEMPERATURE: 97.2 F | OXYGEN SATURATION: 97 % | HEART RATE: 65 BPM | RESPIRATION RATE: 12 BRPM | SYSTOLIC BLOOD PRESSURE: 146 MMHG | DIASTOLIC BLOOD PRESSURE: 54 MMHG

## 2018-07-31 DIAGNOSIS — K80.50 CALCULUS OF BILE DUCT W/OUT CHOLANGITIS OR CHOLECYSTITIS W/OUT OBSTRUCTION: ICD-10-CM
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
## 2018-07-31 DIAGNOSIS — Z09 ENCOUNTER FOR FOLLOW-UP EXAMINATION AFTER COMPLETED TREATMENT FOR CONDITIONS OTHER THAN MALIGNANT NEOPLASM: ICD-10-CM

## 2018-07-31 PROCEDURE — 99349 HOME/RES VST EST MOD MDM 40: CPT

## 2018-08-01 ENCOUNTER — LABORATORY RESULT (OUTPATIENT)
Age: 83
End: 2018-08-01

## 2018-09-11 ENCOUNTER — APPOINTMENT (OUTPATIENT)
Dept: HOME HEALTH SERVICES | Facility: HOME HEALTH | Age: 83
End: 2018-09-11

## 2018-09-12 ENCOUNTER — APPOINTMENT (OUTPATIENT)
Dept: HOME HEALTH SERVICES | Facility: HOME HEALTH | Age: 83
End: 2018-09-12
Payer: MEDICARE

## 2018-09-12 VITALS
TEMPERATURE: 98.3 F | BODY MASS INDEX: 23.9 KG/M2 | OXYGEN SATURATION: 98 % | DIASTOLIC BLOOD PRESSURE: 70 MMHG | SYSTOLIC BLOOD PRESSURE: 138 MMHG | HEART RATE: 62 BPM | WEIGHT: 140 LBS | RESPIRATION RATE: 12 BRPM | HEIGHT: 64 IN

## 2018-09-12 DIAGNOSIS — R53.81 OTHER MALAISE: ICD-10-CM

## 2018-09-12 DIAGNOSIS — G45.9 TRANSIENT CEREBRAL ISCHEMIC ATTACK, UNSPECIFIED: ICD-10-CM

## 2018-09-12 LAB
ALBUMIN SERPL ELPH-MCNC: 4 G/DL
ALP BLD-CCNC: 73 U/L
ALT SERPL-CCNC: 21 U/L
ANION GAP SERPL CALC-SCNC: 15 MMOL/L
AST SERPL-CCNC: 32 U/L
BASOPHILS # BLD AUTO: 0.03 K/UL
BASOPHILS NFR BLD AUTO: 0.4 %
BILIRUB SERPL-MCNC: 0.6 MG/DL
BUN SERPL-MCNC: 36 MG/DL
CALCIUM SERPL-MCNC: 10.4 MG/DL
CHLORIDE SERPL-SCNC: 101 MMOL/L
CO2 SERPL-SCNC: 24 MMOL/L
CREAT SERPL-MCNC: 1.6 MG/DL
EOSINOPHIL # BLD AUTO: 0.07 K/UL
EOSINOPHIL NFR BLD AUTO: 0.8 %
HCT VFR BLD CALC: 37.2 %
HGB BLD-MCNC: 12.3 G/DL
IMM GRANULOCYTES NFR BLD AUTO: 0.2 %
LYMPHOCYTES # BLD AUTO: 2.74 K/UL
LYMPHOCYTES NFR BLD AUTO: 33 %
MAN DIFF?: NORMAL
MCHC RBC-ENTMCNC: 31.2 PG
MCHC RBC-ENTMCNC: 33.1 GM/DL
MCV RBC AUTO: 94.4 FL
MONOCYTES # BLD AUTO: 0.72 K/UL
MONOCYTES NFR BLD AUTO: 8.7 %
NEUTROPHILS # BLD AUTO: 4.73 K/UL
NEUTROPHILS NFR BLD AUTO: 56.9 %
PLATELET # BLD AUTO: 319 K/UL
POTASSIUM SERPL-SCNC: 4.9 MMOL/L
PROT SERPL-MCNC: 7 G/DL
RBC # BLD: 3.94 M/UL
RBC # FLD: 14.9 %
SODIUM SERPL-SCNC: 140 MMOL/L
WBC # FLD AUTO: 8.31 K/UL

## 2018-09-12 PROCEDURE — 99350 HOME/RES VST EST HIGH MDM 60: CPT

## 2018-09-14 ENCOUNTER — OTHER (OUTPATIENT)
Age: 83
End: 2018-09-14

## 2018-09-20 ENCOUNTER — OTHER (OUTPATIENT)
Age: 83
End: 2018-09-20

## 2018-10-03 ENCOUNTER — APPOINTMENT (OUTPATIENT)
Dept: HOME HEALTH SERVICES | Facility: HOME HEALTH | Age: 83
End: 2018-10-03
Payer: MEDICARE

## 2018-10-03 VITALS
RESPIRATION RATE: 14 BRPM | OXYGEN SATURATION: 98 % | TEMPERATURE: 98.8 F | SYSTOLIC BLOOD PRESSURE: 126 MMHG | HEART RATE: 88 BPM | DIASTOLIC BLOOD PRESSURE: 78 MMHG

## 2018-10-03 DIAGNOSIS — M1A.9XX0 CHRONIC GOUT, UNSPECIFIED, W/OUT TOPHUS (TOPHI): ICD-10-CM

## 2018-10-03 DIAGNOSIS — G31.84 MILD COGNITIVE IMPAIRMENT, SO STATED: ICD-10-CM

## 2018-10-03 DIAGNOSIS — Z23 ENCOUNTER FOR IMMUNIZATION: ICD-10-CM

## 2018-10-03 PROCEDURE — 99349 HOME/RES VST EST MOD MDM 40: CPT | Mod: 25,Q5

## 2018-10-03 PROCEDURE — G0008: CPT

## 2018-10-03 PROCEDURE — 90662 IIV NO PRSV INCREASED AG IM: CPT

## 2018-10-03 RX ORDER — CLONIDINE HYDROCHLORIDE 0.1 MG/1
0.1 TABLET ORAL
Refills: 0 | Status: DISCONTINUED | COMMUNITY
End: 2018-10-03

## 2018-10-03 RX ORDER — HYDROCHLOROTHIAZIDE 25 MG/1
25 TABLET ORAL DAILY
Qty: 90 | Refills: 3 | Status: DISCONTINUED | COMMUNITY
End: 2018-10-03

## 2018-10-03 RX ORDER — COLCHICINE 0.6 MG/1
0.6 TABLET ORAL
Qty: 6 | Refills: 0 | Status: DISCONTINUED | COMMUNITY
Start: 2017-10-18 | End: 2018-10-03

## 2018-10-03 RX ORDER — ALLOPURINOL 100 MG/1
100 TABLET ORAL
Qty: 90 | Refills: 3 | Status: DISCONTINUED | COMMUNITY
Start: 2017-11-21 | End: 2018-10-03

## 2018-10-11 ENCOUNTER — APPOINTMENT (OUTPATIENT)
Dept: HOME HEALTH SERVICES | Facility: HOME HEALTH | Age: 83
End: 2018-10-11

## 2018-10-17 ENCOUNTER — RX RENEWAL (OUTPATIENT)
Age: 83
End: 2018-10-17

## 2018-10-17 RX ORDER — LATANOPROST/PF 0.005 %
0.01 DROPS OPHTHALMIC (EYE)
Qty: 7.5 | Refills: 3 | Status: ACTIVE | COMMUNITY
Start: 2017-06-14 | End: 1900-01-01

## 2018-10-17 RX ORDER — TIMOLOL MALEATE 5 MG/ML
0.5 SOLUTION OPHTHALMIC DAILY
Qty: 15 | Refills: 0 | Status: ACTIVE | COMMUNITY
Start: 2017-06-14 | End: 1900-01-01

## 2018-11-19 ENCOUNTER — APPOINTMENT (OUTPATIENT)
Dept: HOME HEALTH SERVICES | Facility: HOME HEALTH | Age: 83
End: 2018-11-19
Payer: MEDICARE

## 2018-11-19 VITALS
RESPIRATION RATE: 16 BRPM | TEMPERATURE: 97.8 F | BODY MASS INDEX: 23.9 KG/M2 | HEART RATE: 80 BPM | DIASTOLIC BLOOD PRESSURE: 70 MMHG | OXYGEN SATURATION: 98 % | WEIGHT: 140 LBS | HEIGHT: 64 IN | SYSTOLIC BLOOD PRESSURE: 120 MMHG

## 2018-11-19 DIAGNOSIS — M15.0 PRIMARY GENERALIZED (OSTEO)ARTHRITIS: ICD-10-CM

## 2018-11-19 PROCEDURE — 99349 HOME/RES VST EST MOD MDM 40: CPT | Mod: Q5

## 2018-11-19 RX ORDER — MULTIVIT-MIN/FOLIC/VIT K/LYCOP 400-300MCG
25 MCG TABLET ORAL DAILY
Refills: 0 | Status: ACTIVE | COMMUNITY
Start: 2018-11-19

## 2018-11-28 PROBLEM — M15.0 PRIMARY OSTEOARTHRITIS INVOLVING MULTIPLE JOINTS: Status: ACTIVE | Noted: 2017-06-14

## 2018-12-03 ENCOUNTER — TRANSCRIPTION ENCOUNTER (OUTPATIENT)
Age: 83
End: 2018-12-03

## 2018-12-03 ENCOUNTER — INPATIENT (INPATIENT)
Facility: HOSPITAL | Age: 83
LOS: 6 days | Discharge: NURSING, REHAB OR LONG-TERM CARE | End: 2018-12-10
Attending: ORTHOPAEDIC SURGERY | Admitting: ORTHOPAEDIC SURGERY
Payer: MEDICARE

## 2018-12-03 VITALS
RESPIRATION RATE: 16 BRPM | SYSTOLIC BLOOD PRESSURE: 164 MMHG | DIASTOLIC BLOOD PRESSURE: 73 MMHG | HEART RATE: 78 BPM | TEMPERATURE: 99 F | OXYGEN SATURATION: 96 %

## 2018-12-03 DIAGNOSIS — M25.562 PAIN IN LEFT KNEE: ICD-10-CM

## 2018-12-04 ENCOUNTER — APPOINTMENT (OUTPATIENT)
Dept: HOME HEALTH SERVICES | Facility: HOME HEALTH | Age: 83
End: 2018-12-04

## 2018-12-04 DIAGNOSIS — Z86.73 PERSONAL HISTORY OF TRANSIENT ISCHEMIC ATTACK (TIA), AND CEREBRAL INFARCTION WITHOUT RESIDUAL DEFICITS: ICD-10-CM

## 2018-12-04 DIAGNOSIS — Z29.9 ENCOUNTER FOR PROPHYLACTIC MEASURES, UNSPECIFIED: ICD-10-CM

## 2018-12-04 DIAGNOSIS — S72.342A DISPLACED SPIRAL FRACTURE OF SHAFT OF LEFT FEMUR, INITIAL ENCOUNTER FOR CLOSED FRACTURE: ICD-10-CM

## 2018-12-04 DIAGNOSIS — N18.3 CHRONIC KIDNEY DISEASE, STAGE 3 (MODERATE): ICD-10-CM

## 2018-12-04 DIAGNOSIS — I10 ESSENTIAL (PRIMARY) HYPERTENSION: ICD-10-CM

## 2018-12-04 DIAGNOSIS — Z01.818 ENCOUNTER FOR OTHER PREPROCEDURAL EXAMINATION: ICD-10-CM

## 2018-12-04 DIAGNOSIS — M15.0 PRIMARY GENERALIZED (OSTEO)ARTHRITIS: ICD-10-CM

## 2018-12-04 DIAGNOSIS — H40.9 UNSPECIFIED GLAUCOMA: ICD-10-CM

## 2018-12-04 LAB
ALBUMIN SERPL ELPH-MCNC: 3.8 G/DL — SIGNIFICANT CHANGE UP (ref 3.3–5)
ALP SERPL-CCNC: 66 U/L — SIGNIFICANT CHANGE UP (ref 40–120)
ALT FLD-CCNC: 17 U/L — SIGNIFICANT CHANGE UP (ref 4–33)
APPEARANCE UR: SIGNIFICANT CHANGE UP
APTT BLD: 27.6 SEC — SIGNIFICANT CHANGE UP (ref 27.5–36.3)
APTT BLD: 33.5 SEC — SIGNIFICANT CHANGE UP (ref 27.5–36.3)
AST SERPL-CCNC: 23 U/L — SIGNIFICANT CHANGE UP (ref 4–32)
BACTERIA # UR AUTO: NEGATIVE — SIGNIFICANT CHANGE UP
BILIRUB SERPL-MCNC: 0.7 MG/DL — SIGNIFICANT CHANGE UP (ref 0.2–1.2)
BILIRUB UR-MCNC: NEGATIVE — SIGNIFICANT CHANGE UP
BLD GP AB SCN SERPL QL: NEGATIVE — SIGNIFICANT CHANGE UP
BLOOD UR QL VISUAL: SIGNIFICANT CHANGE UP
BUN SERPL-MCNC: 29 MG/DL — HIGH (ref 7–23)
BUN SERPL-MCNC: 34 MG/DL — HIGH (ref 7–23)
BUN SERPL-MCNC: 38 MG/DL — HIGH (ref 7–23)
CALCIUM SERPL-MCNC: 8.9 MG/DL — SIGNIFICANT CHANGE UP (ref 8.4–10.5)
CALCIUM SERPL-MCNC: 9.9 MG/DL — SIGNIFICANT CHANGE UP (ref 8.4–10.5)
CALCIUM SERPL-MCNC: 9.9 MG/DL — SIGNIFICANT CHANGE UP (ref 8.4–10.5)
CHLORIDE SERPL-SCNC: 105 MMOL/L — SIGNIFICANT CHANGE UP (ref 98–107)
CHLORIDE SERPL-SCNC: 105 MMOL/L — SIGNIFICANT CHANGE UP (ref 98–107)
CHLORIDE SERPL-SCNC: 109 MMOL/L — HIGH (ref 98–107)
CO2 SERPL-SCNC: 21 MMOL/L — LOW (ref 22–31)
CO2 SERPL-SCNC: 22 MMOL/L — SIGNIFICANT CHANGE UP (ref 22–31)
CO2 SERPL-SCNC: 22 MMOL/L — SIGNIFICANT CHANGE UP (ref 22–31)
COLOR SPEC: YELLOW — SIGNIFICANT CHANGE UP
CREAT SERPL-MCNC: 1.07 MG/DL — SIGNIFICANT CHANGE UP (ref 0.5–1.3)
CREAT SERPL-MCNC: 1.13 MG/DL — SIGNIFICANT CHANGE UP (ref 0.5–1.3)
CREAT SERPL-MCNC: 1.2 MG/DL — SIGNIFICANT CHANGE UP (ref 0.5–1.3)
GLUCOSE SERPL-MCNC: 106 MG/DL — HIGH (ref 70–99)
GLUCOSE SERPL-MCNC: 111 MG/DL — HIGH (ref 70–99)
GLUCOSE SERPL-MCNC: 112 MG/DL — HIGH (ref 70–99)
GLUCOSE UR-MCNC: NEGATIVE — SIGNIFICANT CHANGE UP
HCT VFR BLD CALC: 29.2 % — LOW (ref 34.5–45)
HCT VFR BLD CALC: 31.6 % — LOW (ref 34.5–45)
HCT VFR BLD CALC: 32.6 % — LOW (ref 34.5–45)
HGB BLD-MCNC: 10.4 G/DL — LOW (ref 11.5–15.5)
HGB BLD-MCNC: 10.8 G/DL — LOW (ref 11.5–15.5)
HGB BLD-MCNC: 9.7 G/DL — LOW (ref 11.5–15.5)
INR BLD: 1.11 — SIGNIFICANT CHANGE UP (ref 0.88–1.17)
INR BLD: 1.16 — SIGNIFICANT CHANGE UP (ref 0.88–1.17)
KETONES UR-MCNC: NEGATIVE — SIGNIFICANT CHANGE UP
LEUKOCYTE ESTERASE UR-ACNC: SIGNIFICANT CHANGE UP
MCHC RBC-ENTMCNC: 30.5 PG — SIGNIFICANT CHANGE UP (ref 27–34)
MCHC RBC-ENTMCNC: 31.2 PG — SIGNIFICANT CHANGE UP (ref 27–34)
MCHC RBC-ENTMCNC: 31.8 PG — SIGNIFICANT CHANGE UP (ref 27–34)
MCHC RBC-ENTMCNC: 32.9 % — SIGNIFICANT CHANGE UP (ref 32–36)
MCHC RBC-ENTMCNC: 33.1 % — SIGNIFICANT CHANGE UP (ref 32–36)
MCHC RBC-ENTMCNC: 33.2 % — SIGNIFICANT CHANGE UP (ref 32–36)
MCV RBC AUTO: 92.1 FL — SIGNIFICANT CHANGE UP (ref 80–100)
MCV RBC AUTO: 94.9 FL — SIGNIFICANT CHANGE UP (ref 80–100)
MCV RBC AUTO: 95.7 FL — SIGNIFICANT CHANGE UP (ref 80–100)
NITRITE UR-MCNC: NEGATIVE — SIGNIFICANT CHANGE UP
NRBC # FLD: 0 — SIGNIFICANT CHANGE UP
PH UR: 6 — SIGNIFICANT CHANGE UP (ref 5–8)
PLATELET # BLD AUTO: 242 K/UL — SIGNIFICANT CHANGE UP (ref 150–400)
PLATELET # BLD AUTO: 252 K/UL — SIGNIFICANT CHANGE UP (ref 150–400)
PLATELET # BLD AUTO: 257 K/UL — SIGNIFICANT CHANGE UP (ref 150–400)
PMV BLD: 9.6 FL — SIGNIFICANT CHANGE UP (ref 7–13)
PMV BLD: 9.7 FL — SIGNIFICANT CHANGE UP (ref 7–13)
PMV BLD: 9.8 FL — SIGNIFICANT CHANGE UP (ref 7–13)
POTASSIUM SERPL-MCNC: 4.4 MMOL/L — SIGNIFICANT CHANGE UP (ref 3.5–5.3)
POTASSIUM SERPL-MCNC: 4.4 MMOL/L — SIGNIFICANT CHANGE UP (ref 3.5–5.3)
POTASSIUM SERPL-MCNC: 4.6 MMOL/L — SIGNIFICANT CHANGE UP (ref 3.5–5.3)
POTASSIUM SERPL-SCNC: 4.4 MMOL/L — SIGNIFICANT CHANGE UP (ref 3.5–5.3)
POTASSIUM SERPL-SCNC: 4.4 MMOL/L — SIGNIFICANT CHANGE UP (ref 3.5–5.3)
POTASSIUM SERPL-SCNC: 4.6 MMOL/L — SIGNIFICANT CHANGE UP (ref 3.5–5.3)
PROT SERPL-MCNC: 6.7 G/DL — SIGNIFICANT CHANGE UP (ref 6–8.3)
PROT UR-MCNC: 30 — SIGNIFICANT CHANGE UP
PROTHROM AB SERPL-ACNC: 12.7 SEC — SIGNIFICANT CHANGE UP (ref 9.8–13.1)
PROTHROM AB SERPL-ACNC: 12.9 SEC — SIGNIFICANT CHANGE UP (ref 9.8–13.1)
RBC # BLD: 3.05 M/UL — LOW (ref 3.8–5.2)
RBC # BLD: 3.33 M/UL — LOW (ref 3.8–5.2)
RBC # BLD: 3.54 M/UL — LOW (ref 3.8–5.2)
RBC # FLD: 13.7 % — SIGNIFICANT CHANGE UP (ref 10.3–14.5)
RBC # FLD: 13.8 % — SIGNIFICANT CHANGE UP (ref 10.3–14.5)
RBC # FLD: 14.1 % — SIGNIFICANT CHANGE UP (ref 10.3–14.5)
RBC CASTS # UR COMP ASSIST: SIGNIFICANT CHANGE UP (ref 0–?)
RH IG SCN BLD-IMP: POSITIVE — SIGNIFICANT CHANGE UP
RH IG SCN BLD-IMP: POSITIVE — SIGNIFICANT CHANGE UP
SODIUM SERPL-SCNC: 140 MMOL/L — SIGNIFICANT CHANGE UP (ref 135–145)
SP GR SPEC: 1.02 — SIGNIFICANT CHANGE UP (ref 1–1.04)
SQUAMOUS # UR AUTO: SIGNIFICANT CHANGE UP
UROBILINOGEN FLD QL: NORMAL — SIGNIFICANT CHANGE UP
WBC # BLD: 10.73 K/UL — HIGH (ref 3.8–10.5)
WBC # BLD: 7.85 K/UL — SIGNIFICANT CHANGE UP (ref 3.8–10.5)
WBC # BLD: 8.09 K/UL — SIGNIFICANT CHANGE UP (ref 3.8–10.5)
WBC # FLD AUTO: 10.73 K/UL — HIGH (ref 3.8–10.5)
WBC # FLD AUTO: 7.85 K/UL — SIGNIFICANT CHANGE UP (ref 3.8–10.5)
WBC # FLD AUTO: 8.09 K/UL — SIGNIFICANT CHANGE UP (ref 3.8–10.5)
WBC UR QL: >50 — HIGH (ref 0–?)
YEAST BUDDING # UR COMP ASSIST: SIGNIFICANT CHANGE UP

## 2018-12-04 PROCEDURE — 73502 X-RAY EXAM HIP UNI 2-3 VIEWS: CPT | Mod: 26,LT

## 2018-12-04 PROCEDURE — 71045 X-RAY EXAM CHEST 1 VIEW: CPT | Mod: 26

## 2018-12-04 PROCEDURE — 73700 CT LOWER EXTREMITY W/O DYE: CPT | Mod: 26,LT

## 2018-12-04 PROCEDURE — 27506 TREATMENT OF THIGH FRACTURE: CPT | Mod: LT

## 2018-12-04 PROCEDURE — 73552 X-RAY EXAM OF FEMUR 2/>: CPT | Mod: 26,LT,76

## 2018-12-04 PROCEDURE — 99223 1ST HOSP IP/OBS HIGH 75: CPT

## 2018-12-04 PROCEDURE — 73562 X-RAY EXAM OF KNEE 3: CPT | Mod: 26,LT

## 2018-12-04 PROCEDURE — 72170 X-RAY EXAM OF PELVIS: CPT | Mod: 26,59

## 2018-12-04 RX ORDER — MAGNESIUM HYDROXIDE 400 MG/1
30 TABLET, CHEWABLE ORAL DAILY
Qty: 0 | Refills: 0 | Status: DISCONTINUED | OUTPATIENT
Start: 2018-12-04 | End: 2018-12-10

## 2018-12-04 RX ORDER — FAMOTIDINE 10 MG/ML
20 INJECTION INTRAVENOUS EVERY 12 HOURS
Qty: 0 | Refills: 0 | Status: DISCONTINUED | OUTPATIENT
Start: 2018-12-04 | End: 2018-12-10

## 2018-12-04 RX ORDER — DOCUSATE SODIUM 100 MG
100 CAPSULE ORAL THREE TIMES A DAY
Qty: 0 | Refills: 0 | Status: DISCONTINUED | OUTPATIENT
Start: 2018-12-04 | End: 2018-12-10

## 2018-12-04 RX ORDER — AMLODIPINE BESYLATE 2.5 MG/1
1 TABLET ORAL
Qty: 0 | Refills: 0 | COMMUNITY

## 2018-12-04 RX ORDER — SENNA PLUS 8.6 MG/1
2 TABLET ORAL AT BEDTIME
Qty: 0 | Refills: 0 | Status: DISCONTINUED | OUTPATIENT
Start: 2018-12-04 | End: 2018-12-10

## 2018-12-04 RX ORDER — OXYCODONE HYDROCHLORIDE 5 MG/1
5 TABLET ORAL EVERY 4 HOURS
Qty: 0 | Refills: 0 | Status: DISCONTINUED | OUTPATIENT
Start: 2018-12-04 | End: 2018-12-10

## 2018-12-04 RX ORDER — CEFTRIAXONE 500 MG/1
1 INJECTION, POWDER, FOR SOLUTION INTRAMUSCULAR; INTRAVENOUS ONCE
Qty: 0 | Refills: 0 | Status: COMPLETED | OUTPATIENT
Start: 2018-12-04 | End: 2018-12-04

## 2018-12-04 RX ORDER — SODIUM CHLORIDE 9 MG/ML
3 INJECTION INTRAMUSCULAR; INTRAVENOUS; SUBCUTANEOUS EVERY 8 HOURS
Qty: 0 | Refills: 0 | Status: DISCONTINUED | OUTPATIENT
Start: 2018-12-04 | End: 2018-12-04

## 2018-12-04 RX ORDER — CHOLECALCIFEROL (VITAMIN D3) 125 MCG
1 CAPSULE ORAL
Qty: 0 | Refills: 0 | COMMUNITY

## 2018-12-04 RX ORDER — CHOLECALCIFEROL (VITAMIN D3) 125 MCG
2000 CAPSULE ORAL DAILY
Qty: 0 | Refills: 0 | Status: DISCONTINUED | OUTPATIENT
Start: 2018-12-04 | End: 2018-12-10

## 2018-12-04 RX ORDER — SODIUM CHLORIDE 9 MG/ML
1000 INJECTION INTRAMUSCULAR; INTRAVENOUS; SUBCUTANEOUS
Qty: 0 | Refills: 0 | Status: DISCONTINUED | OUTPATIENT
Start: 2018-12-04 | End: 2018-12-05

## 2018-12-04 RX ORDER — AMLODIPINE BESYLATE 2.5 MG/1
10 TABLET ORAL DAILY
Qty: 0 | Refills: 0 | Status: DISCONTINUED | OUTPATIENT
Start: 2018-12-04 | End: 2018-12-10

## 2018-12-04 RX ORDER — SODIUM CHLORIDE 9 MG/ML
1000 INJECTION INTRAMUSCULAR; INTRAVENOUS; SUBCUTANEOUS
Qty: 0 | Refills: 0 | Status: DISCONTINUED | OUTPATIENT
Start: 2018-12-04 | End: 2018-12-04

## 2018-12-04 RX ORDER — TIMOLOL 0.5 %
1 DROPS OPHTHALMIC (EYE) DAILY
Qty: 0 | Refills: 0 | Status: DISCONTINUED | OUTPATIENT
Start: 2018-12-04 | End: 2018-12-10

## 2018-12-04 RX ORDER — ATENOLOL 25 MG/1
1 TABLET ORAL
Qty: 0 | Refills: 0 | COMMUNITY

## 2018-12-04 RX ORDER — LOSARTAN POTASSIUM 100 MG/1
25 TABLET, FILM COATED ORAL DAILY
Qty: 0 | Refills: 0 | Status: DISCONTINUED | OUTPATIENT
Start: 2018-12-04 | End: 2018-12-04

## 2018-12-04 RX ORDER — HYDROMORPHONE HYDROCHLORIDE 2 MG/ML
0.5 INJECTION INTRAMUSCULAR; INTRAVENOUS; SUBCUTANEOUS EVERY 4 HOURS
Qty: 0 | Refills: 0 | Status: DISCONTINUED | OUTPATIENT
Start: 2018-12-04 | End: 2018-12-10

## 2018-12-04 RX ORDER — LATANOPROST 0.05 MG/ML
1 SOLUTION/ DROPS OPHTHALMIC; TOPICAL AT BEDTIME
Qty: 0 | Refills: 0 | Status: DISCONTINUED | OUTPATIENT
Start: 2018-12-04 | End: 2018-12-04

## 2018-12-04 RX ORDER — HYDROMORPHONE HYDROCHLORIDE 2 MG/ML
0.5 INJECTION INTRAMUSCULAR; INTRAVENOUS; SUBCUTANEOUS EVERY 4 HOURS
Qty: 0 | Refills: 0 | Status: DISCONTINUED | OUTPATIENT
Start: 2018-12-04 | End: 2018-12-04

## 2018-12-04 RX ORDER — CEFTRIAXONE 500 MG/1
1000 INJECTION, POWDER, FOR SOLUTION INTRAMUSCULAR; INTRAVENOUS ONCE
Qty: 0 | Refills: 0 | Status: DISCONTINUED | OUTPATIENT
Start: 2018-12-04 | End: 2018-12-04

## 2018-12-04 RX ORDER — LATANOPROST 0.05 MG/ML
1 SOLUTION/ DROPS OPHTHALMIC; TOPICAL AT BEDTIME
Qty: 0 | Refills: 0 | Status: DISCONTINUED | OUTPATIENT
Start: 2018-12-04 | End: 2018-12-10

## 2018-12-04 RX ORDER — LOSARTAN POTASSIUM 100 MG/1
1 TABLET, FILM COATED ORAL
Qty: 0 | Refills: 0 | COMMUNITY

## 2018-12-04 RX ORDER — ACETAMINOPHEN 500 MG
975 TABLET ORAL EVERY 8 HOURS
Qty: 0 | Refills: 0 | Status: DISCONTINUED | OUTPATIENT
Start: 2018-12-04 | End: 2018-12-10

## 2018-12-04 RX ORDER — CEFAZOLIN SODIUM 1 G
2000 VIAL (EA) INJECTION EVERY 8 HOURS
Qty: 0 | Refills: 0 | Status: COMPLETED | OUTPATIENT
Start: 2018-12-05 | End: 2018-12-05

## 2018-12-04 RX ORDER — OXYCODONE HYDROCHLORIDE 5 MG/1
2.5 TABLET ORAL EVERY 4 HOURS
Qty: 0 | Refills: 0 | Status: DISCONTINUED | OUTPATIENT
Start: 2018-12-04 | End: 2018-12-10

## 2018-12-04 RX ORDER — ENOXAPARIN SODIUM 100 MG/ML
40 INJECTION SUBCUTANEOUS DAILY
Qty: 0 | Refills: 0 | Status: DISCONTINUED | OUTPATIENT
Start: 2018-12-04 | End: 2018-12-10

## 2018-12-04 RX ORDER — TIMOLOL 0.5 %
1 DROPS OPHTHALMIC (EYE) DAILY
Qty: 0 | Refills: 0 | Status: DISCONTINUED | OUTPATIENT
Start: 2018-12-04 | End: 2018-12-04

## 2018-12-04 RX ORDER — ACETAMINOPHEN 500 MG
1000 TABLET ORAL ONCE
Qty: 0 | Refills: 0 | Status: COMPLETED | OUTPATIENT
Start: 2018-12-04 | End: 2018-12-04

## 2018-12-04 RX ADMIN — Medication 400 MILLIGRAM(S): at 12:38

## 2018-12-04 RX ADMIN — Medication 1 DROP(S): at 12:39

## 2018-12-04 RX ADMIN — Medication 975 MILLIGRAM(S): at 06:05

## 2018-12-04 RX ADMIN — LATANOPROST 1 DROP(S): 0.05 SOLUTION/ DROPS OPHTHALMIC; TOPICAL at 23:06

## 2018-12-04 RX ADMIN — SODIUM CHLORIDE 100 MILLILITER(S): 9 INJECTION INTRAMUSCULAR; INTRAVENOUS; SUBCUTANEOUS at 16:24

## 2018-12-04 RX ADMIN — Medication 1000 MILLIGRAM(S): at 13:30

## 2018-12-04 RX ADMIN — Medication 975 MILLIGRAM(S): at 23:05

## 2018-12-04 RX ADMIN — CEFTRIAXONE 100 GRAM(S): 500 INJECTION, POWDER, FOR SOLUTION INTRAMUSCULAR; INTRAVENOUS at 16:24

## 2018-12-04 RX ADMIN — AMLODIPINE BESYLATE 10 MILLIGRAM(S): 2.5 TABLET ORAL at 06:05

## 2018-12-04 NOTE — ED PROVIDER NOTE - MEDICAL DECISION MAKING DETAILS
pt with a fracture of femur with no good etiology.  Family does not recall any trauma.  Will get films at this time.  No indication for head CT.  Will get pre-op labs and consult ortho for admission

## 2018-12-04 NOTE — ED PROVIDER NOTE - OBJECTIVE STATEMENT
89 yo with hx of arthritis and HTN bedbound at baseline presenting with son who found her this morning when the aide called him with L thigh pain and swelling.  No trauma of any kind reported.  Pt is unable to provide a good history due to some baseline dementia.

## 2018-12-04 NOTE — CONSULT NOTE ADULT - ASSESSMENT
S/P fall vs Syncope  suspect orthostatic hypotension which is common in elderly with deconditioned state  EKG unremarkable  no stenotic murmurs on exam   check orthostatic vitals in future    HTN  stable  cont current meds     History of CVA  asa  check lipid profile    PreOp  Based on current ACC/AHA guidelines, patient history and physical exam, the patient is considered to have elevated risk  no absolute CV objection to proceed to this time sensitive surgery
This is a 89F with history of HTN, CVA without residual deficit, Glaucoma R eye and OA who presents to the hospital with inability to ambulate on L leg found to have a L femur fracture.

## 2018-12-04 NOTE — H&P ADULT - HISTORY OF PRESENT ILLNESS
89y Female w/ hx of HTN and glaucoma c/o L leg pain. Patient is minimally ambulatory at baseline and is mostly bedbound during the day except for when working with PT or for transfers where she uses a walker. Collateral obtained from son at bedside (Robby Jara, [phone]). Denies trauma/HS/LOC. Denies numbness/tingling. Denies fever/chills. Denies pain/injury elsewhere. Patient has chronic R olecranon bursitis that is unchanged from baseline.    PAST MEDICAL & SURGICAL HISTORY:  Hypertension  No significant past surgical history    MEDICATIONS  (STANDING):  acetaminophen   Tablet .. 975 milliGRAM(s) Oral every 8 hours  amLODIPine   Tablet 10 milliGRAM(s) Oral daily  cholecalciferol 2000 Unit(s) Oral daily  docusate sodium 100 milliGRAM(s) Oral three times a day  famotidine    Tablet 20 milliGRAM(s) Oral every 12 hours  latanoprost 0.005% Ophthalmic Solution 1 Drop(s) Both EYES at bedtime  losartan 25 milliGRAM(s) Oral daily  multivitamin 1 Tablet(s) Oral daily  senna 2 Tablet(s) Oral at bedtime  sodium chloride 0.9%. 1000 milliLiter(s) IV Continuous <Continuous>  timolol 0.5% Solution 1 Drop(s) Both EYES daily    Allergies  No Known Allergies                        10.8   8.09  )-----------( 257      ( 04 Dec 2018 01:10 )             32.6     04 Dec 2018 01:10    140    |  105    |  38     ----------------------------<  106    4.6     |  22     |  1.20     Ca    9.9        04 Dec 2018 01:10    TPro  6.7    /  Alb  3.8    /  TBili  0.7    /  DBili  x      /  AST  23     /  ALT  17     /  AlkPhos  66     04 Dec 2018 01:10    PT/INR - ( 04 Dec 2018 01:10 )   PT: 12.9 SEC;   INR: 1.16       PTT - ( 04 Dec 2018 01:10 )  PTT:27.6 SEC    Vital Signs Last 24 Hrs  T(C): 36.6 (12-04-18 @ 01:14), Max: 37.6 (12-03-18 @ 21:10)  T(F): 97.9 (12-04-18 @ 01:14), Max: 99.7 (12-03-18 @ 21:10)  HR: 74 (12-04-18 @ 01:14) (73 - 78)  BP: 155/54 (12-04-18 @ 01:14) (149/52 - 414/79)  RR: 16 (12-04-18 @ 01:14) (16 - 16)  SpO2: 99% (12-04-18 @ 01:14) (96% - 99%)    Imaging: XR were personally reviewed and demonstrates spiral fracture of the L midshaft femur    Physical Exam  Gen: NAD  LLE: skin intact, short/ER leg, unable to SLR, +ttp thigh, no ttp elsewhere, +ehl/fhl/ta/gs function, no calf ttp, dp pulse intact, compartments soft  Secondary survey: benign, nv intact, able to SLR contralateral leg, negative log roll contralateral leg, no bony ttp elsewhere, bilateral upper extremity skin intact with no gross deformity, non tender to palpation over bony prominences, neurovascularly intact    Patient put in 10 lbs bucks traction via skin boot  Post traction XR showed improved alignment of the femur    A/P: 89y Female with spiral fracture of the L midshaft femur    Pain control  NWB LLE, bedrest  FU labs/imaging  Admit to orthopaedics team, Dr. Morris  Medical clearance/optimization for OR  Booked for OR tomorrow  NPO/IVF  Will discuss with attending in AM    Clay Duarte MD

## 2018-12-04 NOTE — ED ADULT NURSE NOTE - CHIEF COMPLAINT QUOTE
pt bibems from home, here for left leg pain and obvious swelling to left thigh, pain is causing trouble with ambulation, no falls or trauma

## 2018-12-04 NOTE — ED ADULT NURSE NOTE - OBJECTIVE STATEMENT
Pt received in #25, aox1 (person) with c/o LLE pain. Pt unable to provide any details of how, when or why her legs hurt. Pt's son at bedside deny any known trauma, injuries or falls. Pt with swelling to her left thigh.

## 2018-12-04 NOTE — CONSULT NOTE ADULT - PROBLEM SELECTOR RECOMMENDATION 6
- on Vitamin D supplementation and tylenol prn at home, will c/w vitamin D supplementation, pain control as per ortho

## 2018-12-04 NOTE — BRIEF OPERATIVE NOTE - PROCEDURE
<<-----Click on this checkbox to enter Procedure Intramedullary nail fixation of left femur  12/04/2018    Active  JAK

## 2018-12-04 NOTE — CONSULT NOTE ADULT - PROBLEM SELECTOR RECOMMENDATION 3
- Patient on losartan and amlodipine at home, given her planned procedure would recommend holding her losartan for 24 hrs prior to procedure, can start post-op as tolerated

## 2018-12-04 NOTE — ED CLERICAL - NS ED CLERK NOTE PRE-ARRIVAL INFORMATION; ADDITIONAL PRE-ARRIVAL INFORMATION

## 2018-12-04 NOTE — PROGRESS NOTE ADULT - SUBJECTIVE AND OBJECTIVE BOX
Orthopedic Surgery Progress Note  Pain well controlled.  No chest pain, shortness of breath, light-headedness.    O:  Vital Signs Last 24 Hrs  T(C): 36.4 (04 Dec 2018 22:45), Max: 37.2 (04 Dec 2018 09:47)  T(F): 97.5 (04 Dec 2018 22:45), Max: 98.9 (04 Dec 2018 09:47)  HR: 71 (04 Dec 2018 22:45) (64 - 78)  BP: 129/52 (04 Dec 2018 22:45) (113/61 - 155/54)  BP(mean): 71 (04 Dec 2018 21:30) (63 - 77)  RR: 20 (04 Dec 2018 22:45) (14 - 24)  SpO2: 97% (04 Dec 2018 22:45) (96% - 100%)    Gen: NAD  LLE  Dressings C/D/I  EHL/FHL/TA/GS intact  SILT DP/SP/AHN/Sa  WWP distally, DP pulse palpable    Labs:                        9.7    10.73 )-----------( 242      ( 04 Dec 2018 20:20 )             29.2                         10.4   7.85  )-----------( 252      ( 04 Dec 2018 06:25 )             31.6     12-04    140  |  109<H>  |  29<H>  ----------------------------<  111<H>  4.4   |  21<L>  |  1.07    PT/INR - ( 04 Dec 2018 06:25 )   PT: 12.7 SEC;   INR: 1.11        PTT - ( 04 Dec 2018 06:25 )  PTT:33.5 SEC    A/P 89y year old female POD0 s/p Intramedullary nail fixation of left femur    Pain Control  DVT PPX  PT/OOB  WBAT   FU full length XR of L femur  Dispo Planning    Clay Duarte MD

## 2018-12-04 NOTE — H&P ADULT - ATTENDING COMMENTS
L Femoral shaft fracture indicated for ORIF. all RBAs discussed with family. All questions answered. Informed consent obtained.

## 2018-12-04 NOTE — CONSULT NOTE ADULT - SUBJECTIVE AND OBJECTIVE BOX
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 unclear pt fell or syncopized  Due to altered mental status, subjective information were not able to be obtained from the patient. History was obtained, to the extent possible, from review of the chart and collateral sources of information.     PAST MEDICAL & SURGICAL HISTORY:  Hypertension  No significant past surgical history          MEDICATIONS:  amLODIPine   Tablet 10 milliGRAM(s) Oral daily        acetaminophen   Tablet .. 975 milliGRAM(s) Oral every 8 hours  oxyCODONE    IR 2.5 milliGRAM(s) Oral every 4 hours PRN  oxyCODONE    IR 5 milliGRAM(s) Oral every 4 hours PRN    docusate sodium 100 milliGRAM(s) Oral three times a day  famotidine    Tablet 20 milliGRAM(s) Oral every 12 hours  magnesium hydroxide Suspension 30 milliLiter(s) Oral daily PRN  senna 2 Tablet(s) Oral at bedtime      cholecalciferol 2000 Unit(s) Oral daily  latanoprost 0.005% Ophthalmic Solution 1 Drop(s) Right EYE at bedtime  multivitamin 1 Tablet(s) Oral daily  sodium chloride 0.9%. 1000 milliLiter(s) IV Continuous <Continuous>  timolol 0.5% Solution 1 Drop(s) Right EYE daily      FAMILY HISTORY:  No pertinent family history in first degree relatives      Non-contributory    SOCIAL HISTORY:    No tobacco, drugs or etoh    Allergies    No Known Allergies    Intolerances    	    REVIEW OF SYSTEMS:  as above  The rest of the 14 points ROS reviewed and except above they are unremarkable.        PHYSICAL EXAM:  T(C): 36.8 (12-04-18 @ 05:56), Max: 37.6 (12-03-18 @ 21:10)  HR: 78 (12-04-18 @ 05:56) (73 - 78)  BP: 147/63 (12-04-18 @ 05:56) (147/63 - 414/79)  RR: 17 (12-04-18 @ 05:56) (16 - 17)  SpO2: 98% (12-04-18 @ 05:56) (96% - 99%)  Wt(kg): --  I&O's Summary    JVP: Normal  Neck: supple  Lung: clear   CV: S1 S2 , Murmur:  Abd: soft  Ext: No edema  neuro: Awake / alert  Psych: flat affect  Skin: normal      LABS/DATA:    TELEMETRY: 	    ECG:  	   	  CARDIAC MARKERS:                                      10.4   7.85  )-----------( 252      ( 04 Dec 2018 06:25 )             31.6     12-04    140  |  105  |  34<H>  ----------------------------<  112<H>  4.4   |  22  |  1.13    Ca    9.9      04 Dec 2018 06:25    TPro  6.7  /  Alb  3.8  /  TBili  0.7  /  DBili  x   /  AST  23  /  ALT  17  /  AlkPhos  66  12-04    proBNP:   Lipid Profile:   HgA1c:   TSH:

## 2018-12-04 NOTE — CONSULT NOTE ADULT - SUBJECTIVE AND OBJECTIVE BOX
Patient is a 89y old  Female who presents with a chief complaint of L femur fx (04 Dec 2018 03:21)      HPI:  This is a 89F with history of HTN, CVA (>30 years ago, no residual deficits), Glaucoma R eye, likely dementia (patient is AAO to self only) and OA who presents to the hospital with inability to ambulate since yesterday. Said that she was in her usual state of health until the AM of 12/3 when she was noted to be unable to ambulate on her L leg. Patient and family deny any known falls for the patient. The famiyl said that the patient has a 12 hr HHA who was with the patient on 12/2 until 8 PM and the HHA had not notified the family of any issues. The patient was later checked on by her son later that night and he had not noted any issues either. Around 8AM 12/3, the patient's weekday HHA had arrived to care for the patient and she had noted that the patient was unable to bear any weight on her L leg and that the L leg was swollen and she notified the family. The family then reached out to her on call doctors who recommended she come to the ED and was therefore brought to the ED. Currently patient said that she is unable to bear any weight on her L leg due to "weakness" but otherwise denies any pain in her L leg. She denies any known falls, chest pain, SOB, or palpitations. She said that at baseline she ambulates from room to room with the help of a walker and is unable to walk any significant distances or climb any stairs due to her osteoarthritis. The patient and her family deny any known history of CAD, CHF, or DM. The patient's daughter did state that the patient had a CVA over 30 years ago but had full neurological recovery 6 months after the stroke and has not had any issues with CVAs/TIAs since. The patient denies any other complaints at present.      History limited due to: [ X ] Dementia  [ ] Delirium  [ ] Condition    Pain Symptoms if applicable:             	                         none	   mild         moderate         severe  	                            0	    1-3	     4-6	         7-10  Pain: none  Location:	  Modifying factors:	  Associated symptoms:	    Function: [ ] Independent  [ X ] Assistance  [ ] Total care  [ ] Non-ambulatory    Allergies    No Known Allergies    Intolerances      HOME MEDICATIONS: [ X ] Reviewed - Aspirin 81mg qD PO, Tylenol 1g qD PO prn, Losartan 25mg qD PO, Amlodipine 10mg qD PO, Colace 300mg qHS, Latanoprost 0.005% R eye qHS, Timolol 0.5% R eye qD, and Vitamin D3 1000U qD    MEDICATIONS  (STANDING):  acetaminophen   Tablet .. 975 milliGRAM(s) Oral every 8 hours  amLODIPine   Tablet 10 milliGRAM(s) Oral daily  cholecalciferol 2000 Unit(s) Oral daily  docusate sodium 100 milliGRAM(s) Oral three times a day  famotidine    Tablet 20 milliGRAM(s) Oral every 12 hours  latanoprost 0.005% Ophthalmic Solution 1 Drop(s) Both EYES at bedtime  losartan 25 milliGRAM(s) Oral daily  multivitamin 1 Tablet(s) Oral daily  senna 2 Tablet(s) Oral at bedtime  sodium chloride 0.9%. 1000 milliLiter(s) (100 mL/Hr) IV Continuous <Continuous>  timolol 0.5% Solution 1 Drop(s) Both EYES daily    MEDICATIONS  (PRN):  magnesium hydroxide Suspension 30 milliLiter(s) Oral daily PRN Constipation  oxyCODONE    IR 2.5 milliGRAM(s) Oral every 4 hours PRN Moderate Pain (4 - 6)  oxyCODONE    IR 5 milliGRAM(s) Oral every 4 hours PRN Severe Pain (7 - 10)      PAST MEDICAL & SURGICAL HISTORY:  Hypertension  CVA without residual deficits  Glaucoma R eye  Osteoarthritis  No significant past surgical history  [ X ] Reviewed     SOCIAL HISTORY:  Residence: [ ] [facility]  [ ] [facility]  [ X ] Community - Has a 12hr HHA 7 days a week  [ ] Substance abuse: None  [ ] Tobacco: None  [ ] Alcohol use: None    FAMILY HISTORY:  No pertinent family history in first degree relatives  [ X ] No pertinent family history in first degree relatives     REVIEW OF SYSTEMS:    REVIEW OF SYSTEMS:    CONSTITUTIONAL: No weakness, fevers or chills  EYES: No visual changes or eye discharge  ENT: No rhinorrhea or sore throat  NECK: No pain or stiffness  RESPIRATORY: No cough, wheezing, hemoptysis; No shortness of breath  CARDIOVASCULAR: No chest pain or palpitations; No lower extremity edema  GASTROINTESTINAL: No abdominal or epigastric pain. No nausea, vomiting, or hematemesis; No diarrhea or constipation. No melena or hematochezia.  MSK: L leg weakness and swelling, no back pain  GENITOURINARY: No dysuria, frequency or hematuria  NEUROLOGICAL: No numbness or weakness  SKIN: No itching, burning, rashes, or lesions       Vital Signs Last 24 Hrs  T(C): 36.6 (04 Dec 2018 01:14), Max: 37.6 (03 Dec 2018 21:10)  T(F): 97.9 (04 Dec 2018 01:14), Max: 99.7 (03 Dec 2018 21:10)  HR: 74 (04 Dec 2018 01:14) (73 - 78)  BP: 155/54 (04 Dec 2018 01:14) (149/52 - 414/79)  BP(mean): --  RR: 16 (04 Dec 2018 01:14) (16 - 16)  SpO2: 99% (04 Dec 2018 01:14) (96% - 99%)    PHYSICAL EXAM:  GENERAL: No acute distress, well-developed  ENT: EOMI, PERRLA, conjunctiva and sclera clear, Neck supple, No JVD, moist mucosa  CHEST/LUNG: Clear to auscultation bilaterally; No wheeze, equal breath sounds bilaterally   BACK: No spinal tenderness  HEART: Regular rate and rhythm; No murmurs, rubs, or gallops  ABDOMEN: Soft, Nontender, Nondistended; Bowel sounds present  EXTREMITIES: L leg in traction, L thigh swelling noted, cap refill intact b/l, No edema noted of L LE  PSYCH: Nl behavior, nl affect  NEUROLOGY: AAOx1 (oriented to self, states she is currently in her kitchen and that the current date is 1/14/1948), non-focal, strength grossly 5/5 throughout, sensation intact to light touch throughout, face symmetric, tongue midline, EOMI, V1-V3 sensation intact to light touch  SKIN: Normal color, No rashes or lesions    LABS:                        10.8   8.09  )-----------( 257      ( 04 Dec 2018 01:10 )             32.6     Hemoglobin: 10.8 g/dL (12-04-18 @ 01:10)    12-04    140  |  105  |  38<H>  ----------------------------<  106<H>  4.6   |  22  |  1.20    Ca    9.9      04 Dec 2018 01:10    TPro  6.7  /  Alb  3.8  /  TBili  0.7  /  DBili  x   /  AST  23  /  ALT  17  /  AlkPhos  66  12-04    PT/INR - ( 04 Dec 2018 01:10 )   PT: 12.9 SEC;   INR: 1.16     PTT - ( 04 Dec 2018 01:10 )  PTT:27.6 SEC      Microbiology   RADIOLOGY & ADDITIONAL STUDIES:    EKG:   Personally Reviewed:  [ X ] YES - NSR at 83, QTc 394, no significant ST-T wave changes noted    Imaging:   Personally Reviewed:  [ X ] YES   < from: Xray Chest 1 View AP/PA (12.04.18 @ 01:33) >  ******PRELIMINARY REPORT******            INTERPRETATION:  Trace left pleural effusion.    < end of copied text >                [ ] Consultant(s) Notes Reviewed  [ ] Care Discussed with Consultants/Other Providers:    Advanced Directives: [ ] DNR  [ ] No feeding tube  [ ] MOLST in chart  [ ] MOLST completed today  [ ] Unknown    [ ] Probable osteoporosis  [ ] Possible osteomalacia  [ ] Increased delirium risk  [ ] Delirium and other risks can be reduced by:          -early ambulation          -minimizing "tethers" - IV, oxygen, catheters, etc          -avoiding hypnotics and sedatives          -maintaining hydration/nutrition          -avoid anticholinergics - diphenhydramine, etc          -pain control          -supportive environment Patient is a 89y old  Female who presents with a chief complaint of L femur fx (04 Dec 2018 03:21)      HPI:  This is a 89F with history of HTN, CVA (>30 years ago, no residual deficits), Glaucoma R eye, likely dementia (patient is AAO to self only) and OA who presents to the hospital with inability to ambulate since yesterday. Said that she was in her usual state of health until the AM of 12/3 when she was noted to be unable to ambulate on her L leg. Patient and family deny any known falls for the patient. The famiyl said that the patient has a 12 hr HHA who was with the patient on 12/2 until 8 PM and the HHA had not notified the family of any issues. The patient was later checked on by her son later that night and he had not noted any issues either. Around 8AM 12/3, the patient's weekday HHA had arrived to care for the patient and she had noted that the patient was unable to bear any weight on her L leg and that the L leg was swollen and she notified the family. The family then reached out to her on call doctors who recommended she come to the ED and was therefore brought to the ED. Currently patient said that she is unable to bear any weight on her L leg due to "weakness" but otherwise denies any pain in her L leg. She denies any known falls, chest pain, SOB, or palpitations. She said that at baseline she ambulates from room to room with the help of a walker and is unable to walk any significant distances or climb any stairs due to her osteoarthritis. The patient and her family deny any known history of CAD, CHF, or DM. The patient's daughter did state that the patient had a CVA over 30 years ago but had full neurological recovery 6 months after the stroke and has not had any issues with CVAs/TIAs since. The patient denies any other complaints at present.      History limited due to: [ X ] Dementia  [ ] Delirium  [ ] Condition    Pain Symptoms if applicable:             	                         none	   mild         moderate         severe  	                            0	    1-3	     4-6	         7-10  Pain: none  Location:	  Modifying factors:	  Associated symptoms:	    Function: [ ] Independent  [ X ] Assistance  [ ] Total care  [ ] Non-ambulatory    Allergies    No Known Allergies    Intolerances      HOME MEDICATIONS: [ X ] Reviewed - Aspirin 81mg qD PO, Tylenol 1g qD PO prn, Losartan 25mg qD PO, Amlodipine 10mg qD PO, Colace 300mg qHS, Latanoprost 0.005% R eye qHS, Timolol 0.5% R eye qD, and Vitamin D3 1000U qD    MEDICATIONS  (STANDING):  acetaminophen   Tablet .. 975 milliGRAM(s) Oral every 8 hours  amLODIPine   Tablet 10 milliGRAM(s) Oral daily  cholecalciferol 2000 Unit(s) Oral daily  docusate sodium 100 milliGRAM(s) Oral three times a day  famotidine    Tablet 20 milliGRAM(s) Oral every 12 hours  latanoprost 0.005% Ophthalmic Solution 1 Drop(s) Both EYES at bedtime  losartan 25 milliGRAM(s) Oral daily  multivitamin 1 Tablet(s) Oral daily  senna 2 Tablet(s) Oral at bedtime  sodium chloride 0.9%. 1000 milliLiter(s) (100 mL/Hr) IV Continuous <Continuous>  timolol 0.5% Solution 1 Drop(s) Both EYES daily    MEDICATIONS  (PRN):  magnesium hydroxide Suspension 30 milliLiter(s) Oral daily PRN Constipation  oxyCODONE    IR 2.5 milliGRAM(s) Oral every 4 hours PRN Moderate Pain (4 - 6)  oxyCODONE    IR 5 milliGRAM(s) Oral every 4 hours PRN Severe Pain (7 - 10)      PAST MEDICAL & SURGICAL HISTORY:  Hypertension  CVA without residual deficits  Glaucoma R eye  Osteoarthritis  No significant past surgical history  [ X ] Reviewed     SOCIAL HISTORY:  Residence: [ ] [facility]  [ ] [facility]  [ X ] Community - Has a 12hr HHA 7 days a week  [ ] Substance abuse: None  [ ] Tobacco: None  [ ] Alcohol use: None    FAMILY HISTORY:  No pertinent family history in first degree relatives  [ X ] No pertinent family history in first degree relatives     REVIEW OF SYSTEMS:    REVIEW OF SYSTEMS:    CONSTITUTIONAL: No weakness, fevers or chills  EYES: No visual changes or eye discharge  ENT: No rhinorrhea or sore throat  NECK: No pain or stiffness  RESPIRATORY: No cough, wheezing, hemoptysis; No shortness of breath  CARDIOVASCULAR: No chest pain or palpitations; No lower extremity edema  GASTROINTESTINAL: No abdominal or epigastric pain. No nausea, vomiting, or hematemesis; No diarrhea or constipation. No melena or hematochezia.  MSK: L leg weakness and swelling, no back pain  GENITOURINARY: No dysuria, frequency or hematuria  NEUROLOGICAL: No numbness or weakness  SKIN: No itching, burning, rashes, or lesions       Vital Signs Last 24 Hrs  T(C): 36.6 (04 Dec 2018 01:14), Max: 37.6 (03 Dec 2018 21:10)  T(F): 97.9 (04 Dec 2018 01:14), Max: 99.7 (03 Dec 2018 21:10)  HR: 74 (04 Dec 2018 01:14) (73 - 78)  BP: 155/54 (04 Dec 2018 01:14) (149/52 - 414/79)  BP(mean): --  RR: 16 (04 Dec 2018 01:14) (16 - 16)  SpO2: 99% (04 Dec 2018 01:14) (96% - 99%)    PHYSICAL EXAM:  GENERAL: No acute distress, well-developed  ENT: EOMI, PERRLA, conjunctiva and sclera clear, Neck supple, No JVD, moist mucosa  CHEST/LUNG: Clear to auscultation bilaterally; No wheeze, equal breath sounds bilaterally   BACK: No spinal tenderness  HEART: Regular rate and rhythm; No murmurs, rubs, or gallops  ABDOMEN: Soft, Nontender, Nondistended; Bowel sounds present  EXTREMITIES: L leg in traction, L thigh swelling noted, cap refill intact b/l, No edema noted of L LE; Large osteophyte of R elbow  PSYCH: Nl behavior, nl affect  NEUROLOGY: AAOx1 (oriented to self, states she is currently in her kitchen and that the current date is 1/14/1948), non-focal, strength grossly 5/5 throughout, sensation intact to light touch throughout, face symmetric, tongue midline, EOMI, V1-V3 sensation intact to light touch  SKIN: Normal color, No rashes or lesions    LABS:                        10.8   8.09  )-----------( 257      ( 04 Dec 2018 01:10 )             32.6     Hemoglobin: 10.8 g/dL (12-04-18 @ 01:10)    12-04    140  |  105  |  38<H>  ----------------------------<  106<H>  4.6   |  22  |  1.20    Ca    9.9      04 Dec 2018 01:10    TPro  6.7  /  Alb  3.8  /  TBili  0.7  /  DBili  x   /  AST  23  /  ALT  17  /  AlkPhos  66  12-04    PT/INR - ( 04 Dec 2018 01:10 )   PT: 12.9 SEC;   INR: 1.16     PTT - ( 04 Dec 2018 01:10 )  PTT:27.6 SEC      Microbiology   RADIOLOGY & ADDITIONAL STUDIES:    EKG:   Personally Reviewed:  [ X ] YES - NSR at 83, QTc 394, no significant ST-T wave changes noted    Imaging:   Personally Reviewed:  [ X ] YES   < from: Xray Chest 1 View AP/PA (12.04.18 @ 01:33) >  ******PRELIMINARY REPORT******            INTERPRETATION:  Trace left pleural effusion.    < end of copied text >    < from: Xray Hip/Femur 2 Views, Left (12.04.18 @ 01:33) >  ******PRELIMINARY REPORT******            INTERPRETATION:  Markedly displaced and spirally oriented fracture of the   proximal to mid left femoral shaft.    < end of copied text >                [ X ] Consultant(s) Notes Reviewed  [ X ] Care Discussed with Consultants/Other Providers: Ortho    Advanced Directives: [ ] DNR  [ ] No feeding tube  [ ] MOLST in chart  [ ] MOLST completed today  [ X ] Unknown    [ ] Probable osteoporosis  [ ] Possible osteomalacia  [ ] Increased delirium risk  [ ] Delirium and other risks can be reduced by:          -early ambulation          -minimizing "tethers" - IV, oxygen, catheters, etc          -avoiding hypnotics and sedatives          -maintaining hydration/nutrition          -avoid anticholinergics - diphenhydramine, etc          -pain control          -supportive environment Patient is a 89y old  Female who presents with a chief complaint of L femur fx (04 Dec 2018 03:21)      HPI:  This is a 89F with history of HTN, CVA (>30 years ago, no residual deficits), Glaucoma R eye, likely dementia (patient is AAO to self only) and OA who presents to the hospital with inability to ambulate since yesterday. Said that she was in her usual state of health until the AM of 12/3 when she was noted to be unable to ambulate on her L leg. Patient and family deny any known falls for the patient. The famiyl said that the patient has a 12 hr HHA who was with the patient on 12/2 until 8 PM and the HHA had not notified the family of any issues. The patient was later checked on by her son later that night and he had not noted any issues either. Around 8AM 12/3, the patient's weekday HHA had arrived to care for the patient and she had noted that the patient was unable to bear any weight on her L leg and that the L leg was swollen and she notified the family. The family then reached out to her on call doctors who recommended she come to the ED and was therefore brought to the ED. Currently patient said that she is unable to bear any weight on her L leg due to "weakness" but otherwise denies any pain in her L leg. She denies any known falls, chest pain, SOB, or palpitations. She said that at baseline she ambulates from room to room with the help of a walker and is unable to walk any significant distances or climb any stairs due to her osteoarthritis. The patient and her family deny any known history of CAD, CHF, or DM. The patient's daughter did state that the patient had a CVA over 30 years ago but had full neurological recovery 6 months after the stroke and has not had any issues with CVAs/TIAs since. The patient denies any other complaints at present.      History limited due to: [ X ] Dementia  [ ] Delirium  [ ] Condition    Pain Symptoms if applicable:             	                         none	   mild         moderate         severe  	                            0	    1-3	     4-6	         7-10  Pain: none  Location:	  Modifying factors:	  Associated symptoms:	    Function: [ ] Independent  [ X ] Assistance  [ ] Total care  [ ] Non-ambulatory    Allergies    No Known Allergies    Intolerances      HOME MEDICATIONS: [ X ] Reviewed - Aspirin 81mg qD PO, Tylenol 1g qD PO prn, Losartan 25mg qD PO, Amlodipine 10mg qD PO, Colace 300mg qHS, Latanoprost 0.005% R eye qHS, Timolol 0.5% R eye qD, and Vitamin D3 1000U qD    MEDICATIONS  (STANDING):  acetaminophen   Tablet .. 975 milliGRAM(s) Oral every 8 hours  amLODIPine   Tablet 10 milliGRAM(s) Oral daily  cholecalciferol 2000 Unit(s) Oral daily  docusate sodium 100 milliGRAM(s) Oral three times a day  famotidine    Tablet 20 milliGRAM(s) Oral every 12 hours  latanoprost 0.005% Ophthalmic Solution 1 Drop(s) Both EYES at bedtime  losartan 25 milliGRAM(s) Oral daily  multivitamin 1 Tablet(s) Oral daily  senna 2 Tablet(s) Oral at bedtime  sodium chloride 0.9%. 1000 milliLiter(s) (100 mL/Hr) IV Continuous <Continuous>  timolol 0.5% Solution 1 Drop(s) Both EYES daily    MEDICATIONS  (PRN):  magnesium hydroxide Suspension 30 milliLiter(s) Oral daily PRN Constipation  oxyCODONE    IR 2.5 milliGRAM(s) Oral every 4 hours PRN Moderate Pain (4 - 6)  oxyCODONE    IR 5 milliGRAM(s) Oral every 4 hours PRN Severe Pain (7 - 10)      PAST MEDICAL & SURGICAL HISTORY:  Hypertension  CVA without residual deficits  Glaucoma R eye  Osteoarthritis  No significant past surgical history  [ X ] Reviewed     SOCIAL HISTORY:  Residence: [ ] [facility]  [ ] [facility]  [ X ] Community - Has a 12hr HHA 7 days a week  [ ] Substance abuse: None  [ ] Tobacco: None  [ ] Alcohol use: None    FAMILY HISTORY:  No pertinent family history in first degree relatives  [ X ] No pertinent family history in first degree relatives     REVIEW OF SYSTEMS:    REVIEW OF SYSTEMS:    CONSTITUTIONAL: No weakness, fevers or chills  EYES: No visual changes or eye discharge  ENT: No rhinorrhea or sore throat  NECK: No pain or stiffness  RESPIRATORY: No cough, wheezing, hemoptysis; No shortness of breath  CARDIOVASCULAR: No chest pain or palpitations; No lower extremity edema  GASTROINTESTINAL: No abdominal or epigastric pain. No nausea, vomiting, or hematemesis; No diarrhea or constipation. No melena or hematochezia.  MSK: L leg weakness and swelling, no back pain  GENITOURINARY: No dysuria, frequency or hematuria  NEUROLOGICAL: No numbness or weakness  SKIN: No itching, burning, rashes, or lesions       Vital Signs Last 24 Hrs  T(C): 36.6 (04 Dec 2018 01:14), Max: 37.6 (03 Dec 2018 21:10)  T(F): 97.9 (04 Dec 2018 01:14), Max: 99.7 (03 Dec 2018 21:10)  HR: 74 (04 Dec 2018 01:14) (73 - 78)  BP: 155/54 (04 Dec 2018 01:14) (149/52 - 414/79)  BP(mean): --  RR: 16 (04 Dec 2018 01:14) (16 - 16)  SpO2: 99% (04 Dec 2018 01:14) (96% - 99%)    PHYSICAL EXAM:  GENERAL: No acute distress, well-developed  ENT: EOMI, PERRLA, conjunctiva and sclera clear, Neck supple, No JVD, moist mucosa  CHEST/LUNG: Clear to auscultation bilaterally; No wheeze, equal breath sounds bilaterally   BACK: No spinal tenderness  HEART: Regular rate and rhythm; No murmurs, rubs, or gallops  ABDOMEN: Soft, Nontender, Nondistended; Bowel sounds present  EXTREMITIES: L leg in traction, L thigh swelling noted, cap refill intact b/l, No edema noted of R LE; Large osteophyte of R elbow  PSYCH: Nl behavior, nl affect  NEUROLOGY: AAOx1 (oriented to self, states she is currently in her kitchen and that the current date is 1/14/1948), non-focal, strength grossly 5/5 throughout (unable to  L LE strength as it is in traction currently), sensation intact to light touch throughout, face symmetric, tongue midline, EOMI, V1-V3 sensation intact to light touch  SKIN: Normal color, No rashes or lesions    LABS:                        10.8   8.09  )-----------( 257      ( 04 Dec 2018 01:10 )             32.6     Hemoglobin: 10.8 g/dL (12-04-18 @ 01:10)    12-04    140  |  105  |  38<H>  ----------------------------<  106<H>  4.6   |  22  |  1.20    Ca    9.9      04 Dec 2018 01:10    TPro  6.7  /  Alb  3.8  /  TBili  0.7  /  DBili  x   /  AST  23  /  ALT  17  /  AlkPhos  66  12-04    PT/INR - ( 04 Dec 2018 01:10 )   PT: 12.9 SEC;   INR: 1.16     PTT - ( 04 Dec 2018 01:10 )  PTT:27.6 SEC      Microbiology   RADIOLOGY & ADDITIONAL STUDIES:    EKG:   Personally Reviewed:  [ X ] YES - NSR at 83, QTc 394, no significant ST-T wave changes noted    Imaging:   Personally Reviewed:  [ X ] YES   < from: Xray Chest 1 View AP/PA (12.04.18 @ 01:33) >  ******PRELIMINARY REPORT******            INTERPRETATION:  Trace left pleural effusion.    < end of copied text >    < from: Xray Hip/Femur 2 Views, Left (12.04.18 @ 01:33) >  ******PRELIMINARY REPORT******            INTERPRETATION:  Markedly displaced and spirally oriented fracture of the   proximal to mid left femoral shaft.    < end of copied text >                [ X ] Consultant(s) Notes Reviewed  [ X ] Care Discussed with Consultants/Other Providers: Ortho    Advanced Directives: [ ] DNR  [ ] No feeding tube  [ ] MOLST in chart  [ ] MOLST completed today  [ X ] Unknown    [ ] Probable osteoporosis  [ ] Possible osteomalacia  [ ] Increased delirium risk  [ ] Delirium and other risks can be reduced by:          -early ambulation          -minimizing "tethers" - IV, oxygen, catheters, etc          -avoiding hypnotics and sedatives          -maintaining hydration/nutrition          -avoid anticholinergics - diphenhydramine, etc          -pain control          -supportive environment

## 2018-12-04 NOTE — CONSULT NOTE ADULT - PROBLEM SELECTOR RECOMMENDATION 2
- Unclear etiology to patient's fracture, patient and her family deny any known falls or syncopal episodes but there is still a concern that the patient might have had a syncopal episode causing her fracture, would therefore recommend cardiology eval in AM to assess for any further cardiac testing - Unclear etiology to patient's fracture, patient and her family deny any known falls or syncopal episodes but there is still a concern that the patient might have had a fall (unclear if syncopal or mechanical) causing her fracture, would therefore recommend cardiology eval in AM to assess for any further cardiac testing

## 2018-12-04 NOTE — ED ADULT NURSE NOTE - NSIMPLEMENTINTERV_GEN_ALL_ED
Implemented All Universal Safety Interventions:  [place] to call system. Call bell, personal items and telephone within reach. Instruct patient to call for assistance. Room bathroom lighting operational. Non-slip footwear when patient is off stretcher. Physically safe environment: no spills, clutter or unnecessary equipment. Stretcher in lowest position, wheels locked, appropriate side rails in place.

## 2018-12-05 DIAGNOSIS — N39.0 URINARY TRACT INFECTION, SITE NOT SPECIFIED: ICD-10-CM

## 2018-12-05 DIAGNOSIS — D62 ACUTE POSTHEMORRHAGIC ANEMIA: ICD-10-CM

## 2018-12-05 LAB
BUN SERPL-MCNC: 31 MG/DL — HIGH (ref 7–23)
CALCIUM SERPL-MCNC: 8.7 MG/DL — SIGNIFICANT CHANGE UP (ref 8.4–10.5)
CHLORIDE SERPL-SCNC: 110 MMOL/L — HIGH (ref 98–107)
CO2 SERPL-SCNC: 18 MMOL/L — LOW (ref 22–31)
CREAT SERPL-MCNC: 1.12 MG/DL — SIGNIFICANT CHANGE UP (ref 0.5–1.3)
GLUCOSE SERPL-MCNC: 124 MG/DL — HIGH (ref 70–99)
HCT VFR BLD CALC: 23 % — LOW (ref 34.5–45)
HGB BLD-MCNC: 7.6 G/DL — LOW (ref 11.5–15.5)
MCHC RBC-ENTMCNC: 30.9 PG — SIGNIFICANT CHANGE UP (ref 27–34)
MCHC RBC-ENTMCNC: 33 % — SIGNIFICANT CHANGE UP (ref 32–36)
MCV RBC AUTO: 93.5 FL — SIGNIFICANT CHANGE UP (ref 80–100)
NRBC # FLD: 0 — SIGNIFICANT CHANGE UP
PLATELET # BLD AUTO: 189 K/UL — SIGNIFICANT CHANGE UP (ref 150–400)
PMV BLD: 9.9 FL — SIGNIFICANT CHANGE UP (ref 7–13)
POTASSIUM SERPL-MCNC: 4.3 MMOL/L — SIGNIFICANT CHANGE UP (ref 3.5–5.3)
POTASSIUM SERPL-SCNC: 4.3 MMOL/L — SIGNIFICANT CHANGE UP (ref 3.5–5.3)
RBC # BLD: 2.46 M/UL — LOW (ref 3.8–5.2)
RBC # FLD: 13.7 % — SIGNIFICANT CHANGE UP (ref 10.3–14.5)
SODIUM SERPL-SCNC: 142 MMOL/L — SIGNIFICANT CHANGE UP (ref 135–145)
WBC # BLD: 9.31 K/UL — SIGNIFICANT CHANGE UP (ref 3.8–10.5)
WBC # FLD AUTO: 9.31 K/UL — SIGNIFICANT CHANGE UP (ref 3.8–10.5)

## 2018-12-05 PROCEDURE — 73552 X-RAY EXAM OF FEMUR 2/>: CPT | Mod: 26,LT

## 2018-12-05 PROCEDURE — 99233 SBSQ HOSP IP/OBS HIGH 50: CPT

## 2018-12-05 RX ORDER — LOSARTAN POTASSIUM 100 MG/1
25 TABLET, FILM COATED ORAL DAILY
Qty: 0 | Refills: 0 | Status: DISCONTINUED | OUTPATIENT
Start: 2018-12-05 | End: 2018-12-10

## 2018-12-05 RX ORDER — SODIUM CHLORIDE 9 MG/ML
1000 INJECTION, SOLUTION INTRAVENOUS
Qty: 0 | Refills: 0 | Status: DISCONTINUED | OUTPATIENT
Start: 2018-12-05 | End: 2018-12-06

## 2018-12-05 RX ORDER — ONDANSETRON 8 MG/1
4 TABLET, FILM COATED ORAL EVERY 6 HOURS
Qty: 0 | Refills: 0 | Status: DISCONTINUED | OUTPATIENT
Start: 2018-12-05 | End: 2018-12-10

## 2018-12-05 RX ORDER — CEFTRIAXONE 500 MG/1
1 INJECTION, POWDER, FOR SOLUTION INTRAMUSCULAR; INTRAVENOUS EVERY 24 HOURS
Qty: 0 | Refills: 0 | Status: COMPLETED | OUTPATIENT
Start: 2018-12-05 | End: 2018-12-07

## 2018-12-05 RX ORDER — TRAMADOL HYDROCHLORIDE 50 MG/1
25 TABLET ORAL
Qty: 0 | Refills: 0 | Status: DISCONTINUED | OUTPATIENT
Start: 2018-12-05 | End: 2018-12-10

## 2018-12-05 RX ADMIN — AMLODIPINE BESYLATE 10 MILLIGRAM(S): 2.5 TABLET ORAL at 05:14

## 2018-12-05 RX ADMIN — ENOXAPARIN SODIUM 40 MILLIGRAM(S): 100 INJECTION SUBCUTANEOUS at 12:19

## 2018-12-05 RX ADMIN — Medication 975 MILLIGRAM(S): at 13:40

## 2018-12-05 RX ADMIN — FAMOTIDINE 20 MILLIGRAM(S): 10 INJECTION INTRAVENOUS at 05:14

## 2018-12-05 RX ADMIN — Medication 100 MILLIGRAM(S): at 21:55

## 2018-12-05 RX ADMIN — Medication 100 MILLIGRAM(S): at 01:32

## 2018-12-05 RX ADMIN — CEFTRIAXONE 100 GRAM(S): 500 INJECTION, POWDER, FOR SOLUTION INTRAMUSCULAR; INTRAVENOUS at 13:41

## 2018-12-05 RX ADMIN — Medication 975 MILLIGRAM(S): at 05:14

## 2018-12-05 RX ADMIN — Medication 1 TABLET(S): at 12:19

## 2018-12-05 RX ADMIN — SODIUM CHLORIDE 75 MILLILITER(S): 9 INJECTION, SOLUTION INTRAVENOUS at 21:55

## 2018-12-05 RX ADMIN — Medication 2000 UNIT(S): at 12:19

## 2018-12-05 RX ADMIN — LOSARTAN POTASSIUM 25 MILLIGRAM(S): 100 TABLET, FILM COATED ORAL at 21:58

## 2018-12-05 RX ADMIN — FAMOTIDINE 20 MILLIGRAM(S): 10 INJECTION INTRAVENOUS at 18:11

## 2018-12-05 RX ADMIN — Medication 975 MILLIGRAM(S): at 21:54

## 2018-12-05 RX ADMIN — SENNA PLUS 2 TABLET(S): 8.6 TABLET ORAL at 21:55

## 2018-12-05 RX ADMIN — LATANOPROST 1 DROP(S): 0.05 SOLUTION/ DROPS OPHTHALMIC; TOPICAL at 21:55

## 2018-12-05 RX ADMIN — Medication 100 MILLIGRAM(S): at 12:19

## 2018-12-05 RX ADMIN — Medication 1 DROP(S): at 12:20

## 2018-12-05 NOTE — OCCUPATIONAL THERAPY INITIAL EVALUATION ADULT - RANGE OF MOTION EXAMINATION, UPPER EXTREMITY
Right UE--> shoulder AAROM 0-90 degress, elbow AAROM -15 to 130 degrees, hand/wrist contracture. Left UE-->  shoulder AAROM 0-90 degress and WFL distally.

## 2018-12-05 NOTE — PROGRESS NOTE ADULT - SUBJECTIVE AND OBJECTIVE BOX
CHIEF COMPLAINT: f/u left femur fracture    SUBJECTIVE / OVERNIGHT EVENTS: Patient seen and examined. No acute events overnight. Pain well controlled and patient without any complaints.    MEDICATIONS  (STANDING):  acetaminophen   Tablet .. 975 milliGRAM(s) Oral every 8 hours  amLODIPine   Tablet 10 milliGRAM(s) Oral daily  ceFAZolin   IVPB 2000 milliGRAM(s) IV Intermittent every 8 hours  cholecalciferol 2000 Unit(s) Oral daily  docusate sodium 100 milliGRAM(s) Oral three times a day  enoxaparin Injectable 40 milliGRAM(s) SubCutaneous daily  famotidine    Tablet 20 milliGRAM(s) Oral every 12 hours  latanoprost 0.005% Ophthalmic Solution 1 Drop(s) Right EYE at bedtime  multivitamin 1 Tablet(s) Oral daily  senna 2 Tablet(s) Oral at bedtime  sodium chloride 0.9%. 1000 milliLiter(s) (100 mL/Hr) IV Continuous <Continuous>  timolol 0.5% Solution 1 Drop(s) Right EYE daily    MEDICATIONS  (PRN):  HYDROmorphone  Injectable 0.5 milliGRAM(s) IV Push every 4 hours PRN Breakthrough Pain  magnesium hydroxide Suspension 30 milliLiter(s) Oral daily PRN Constipation  ondansetron Injectable 4 milliGRAM(s) IV Push every 6 hours PRN Nausea and/or Vomiting  oxyCODONE    IR 2.5 milliGRAM(s) Oral every 4 hours PRN Moderate Pain (4 - 6)  oxyCODONE    IR 5 milliGRAM(s) Oral every 4 hours PRN Severe Pain (7 - 10)  traMADol 25 milliGRAM(s) Oral two times a day PRN Mild Pain (1 - 3)    VITALS:  T(F): 98 (18 @ 09:21), Max: 98.9 (18 @ 05:11)  HR: 93 (18 @ 09:21) (64 - 93)  BP: 127/44 (18 @ 09:21) (113/61 - 147/57)  RR: 16 (18 @ 09:21) (14 - 24)  SpO2: 95% (18 @ 09:21)    PHYSICAL EXAM:  GENERAL: NAD, well-developed  CHEST/LUNG: Clear to auscultation bilaterally; No wheeze  HEART: Regular rate and rhythm; No murmurs, rubs, or gallops  ABDOMEN: Soft, Nontender, Nondistended; Bowel sounds present  EXTREMITIES:  2+ Peripheral Pulses, No clubbing, cyanosis, or edema  NEUROLOGY: non-focal  SKIN: dressing C/D/I    LABS:              7.6                  142  | 18   | 31           9.31  >-----------< 189     ------------------------< 124                   23.0                 4.3  | 110  | 1.12                                         Ca 8.7   Mg x     Ph x           TPro  6.7  /  Alb  3.8      TBili  0.7  /  DBili  x         AST  23  /  ALT  17            AlkPhos  66      INR: 1.11 ;    PT: 12.7 SEC;    PTT: 33.5 SEC    Urinalysis Basic - ( 04 Dec 2018 09:00 )  Color: YELLOW / Appearance: Lt TURBID / S.024 / pH: 6.0  Gluc: NEGATIVE / Ketone: NEGATIVE  / Bili: NEGATIVE / Urobili: NORMAL   Blood: SMALL / Protein: 30 / Nitrite: NEGATIVE   Leuk Esterase: LARGE / RBC: 3-5 / WBC >50   Sq Epi: FEW / Non Sq Epi: x / Bacteria: NEGATIVE    RADIOLOGY & ADDITIONAL TESTS:  Imaging Personally Reviewed: [x] Yes  < from: CT Femur No Cont, Left (18 @ 14:22) >  IMPRESSION:  Displaced overriding externally rotated mid left femoral shaft oblique   fracture. No underlying pathologic lesion.    Associated surrounding soft tissue swelling with mild intramuscular   hematoma formation in the overlying vastus intermedius muscle.    Close proximity of the sharp distal edge of the proximal fracture margin   to the overlying superficial femoral vasculature which otherwise appears   undisturbed.    < end of copied text >    [ ] Consultant(s) Notes Reviewed:  [x] Care Discussed with Consultants/Other Providers: Orthopedic PA - discussed management of anemia

## 2018-12-05 NOTE — PROGRESS NOTE ADULT - SUBJECTIVE AND OBJECTIVE BOX
Ortho       Patient is seen and examined at bedside.  No significant overnight events. Pain well controlled at moment.      Vital Signs Last 24 Hrs  T(C): 37.2 (05 Dec 2018 05:11), Max: 37.2 (04 Dec 2018 09:47)  T(F): 98.9 (05 Dec 2018 05:11), Max: 98.9 (04 Dec 2018 09:47)  HR: 81 (05 Dec 2018 05:11) (64 - 81)  BP: 125/42 (05 Dec 2018 05:11) (113/61 - 147/57)  BP(mean): 71 (04 Dec 2018 21:30) (63 - 77)  RR: 20 (05 Dec 2018 05:11) (14 - 24)  SpO2: 94% (05 Dec 2018 05:11) (94% - 100%)    Gen: NAD    LLE: Dressing C/D/I.   Patient doesn't follow commands but has spontaneous movement, motor grossly intact distal + EHL/FHL/ TA/ GS. Sensation is grossly intact to light touch distal. Compartments are soft. Extremity  warm.      Labs:                        9.7    10.73 )-----------( 242      ( 04 Dec 2018 20:20 )             29.2     12-04    140  |  109<H>  |  29<H>  ----------------------------<  111<H>  4.4   |  21<L>  |  1.07    Ca    8.9      04 Dec 2018 20:20    TPro  6.7  /  Alb  3.8  /  TBili  0.7  /  DBili  x   /  AST  23  /  ALT  17  /  AlkPhos  66  12-04      A/P: Patient is a 89y y/o Female s/p left femur IM nail POD#1  - Pain control / Analgesia  - Inc Spirometry   - Venodynes  - DVT Prophylaxis - lovenox  - PT / OT  - WBAT / OOB

## 2018-12-05 NOTE — OCCUPATIONAL THERAPY INITIAL EVALUATION ADULT - HEALTH SCREEN CRITERIA
----- Message from Debra Matta MD sent at 11/28/2018  9:12 AM EST -----  She has slip for labs prior to next visit    Please add sed rate C-reactive protein with diagnosis of arthritis yes

## 2018-12-05 NOTE — PROGRESS NOTE ADULT - SUBJECTIVE AND OBJECTIVE BOX
Subjective: Patient seen and examined. No new events except as noted.     SUBJECTIVE/ROS:  NAD      MEDICATIONS:  MEDICATIONS  (STANDING):  acetaminophen   Tablet .. 975 milliGRAM(s) Oral every 8 hours  amLODIPine   Tablet 10 milliGRAM(s) Oral daily  ceFAZolin   IVPB 2000 milliGRAM(s) IV Intermittent every 8 hours  cholecalciferol 2000 Unit(s) Oral daily  docusate sodium 100 milliGRAM(s) Oral three times a day  enoxaparin Injectable 40 milliGRAM(s) SubCutaneous daily  famotidine    Tablet 20 milliGRAM(s) Oral every 12 hours  latanoprost 0.005% Ophthalmic Solution 1 Drop(s) Right EYE at bedtime  multivitamin 1 Tablet(s) Oral daily  senna 2 Tablet(s) Oral at bedtime  timolol 0.5% Solution 1 Drop(s) Right EYE daily      PHYSICAL EXAM:  T(C): 36.7 (12-05-18 @ 09:21), Max: 37.2 (12-05-18 @ 05:11)  HR: 93 (12-05-18 @ 09:21) (64 - 93)  BP: 127/44 (12-05-18 @ 09:21) (113/61 - 147/57)  RR: 16 (12-05-18 @ 09:21) (14 - 24)  SpO2: 95% (12-05-18 @ 09:21) (94% - 100%)  Wt(kg): --  I&O's Summary    04 Dec 2018 07:01  -  05 Dec 2018 07:00  --------------------------------------------------------  IN: 200 mL / OUT: 735 mL / NET: -535 mL    05 Dec 2018 07:01  -  05 Dec 2018 12:07  --------------------------------------------------------  IN: 0 mL / OUT: 225 mL / NET: -225 mL        Weight (kg): 66.5 (12-04 @ 15:53)    JVP: Normal  Neck: supple  Lung: clear   CV: S1 S2 , Murmur:  Abd: soft  Ext: No edema  neuro: Awake / alert  Psych: flat affect  Skin: normal        LABS/DATA:    CARDIAC MARKERS:                                7.6    9.31  )-----------( 189      ( 05 Dec 2018 05:41 )             23.0     12-05    142  |  110<H>  |  31<H>  ----------------------------<  124<H>  4.3   |  18<L>  |  1.12    Ca    8.7      05 Dec 2018 05:41    TPro  6.7  /  Alb  3.8  /  TBili  0.7  /  DBili  x   /  AST  23  /  ALT  17  /  AlkPhos  66  12-04    proBNP:   Lipid Profile:   HgA1c:   TSH:     TELE:  EKG:

## 2018-12-05 NOTE — PHYSICAL THERAPY INITIAL EVALUATION ADULT - RANGE OF MOTION EXAMINATION, REHAB EVAL
bilateral upper extremity ROM was WFL (within functional limits)/deficits as listed below/ex. L hip/: 0-40, dec. ROM R hand

## 2018-12-05 NOTE — OCCUPATIONAL THERAPY INITIAL EVALUATION ADULT - PERTINENT HX OF CURRENT PROBLEM, REHAB EVAL
89 y.o F w/ hx of HTN and glaucoma c/o Left leg pain. Left LE xray: Redemonstration of a markedly displaced and spirally oriented fracture of the proximal to mid left femoral shaft. Pt now s/p Intramedullary nail fixation of left femur

## 2018-12-05 NOTE — OCCUPATIONAL THERAPY INITIAL EVALUATION ADULT - PLANNED THERAPY INTERVENTIONS, OT EVAL
ROM/strengthening/transfer training/ADL retraining/bed mobility training/balance training/cognitive, visual perceptual/neuromuscular re-education

## 2018-12-05 NOTE — OCCUPATIONAL THERAPY INITIAL EVALUATION ADULT - DIAGNOSIS, OT EVAL
Left side neglect s/p Intramedullary nail fixation of left femur, baseline right hand contracture, left side neglect/visual deficit, decreased functional mobility, decreased ADL independence

## 2018-12-06 ENCOUNTER — TRANSCRIPTION ENCOUNTER (OUTPATIENT)
Age: 83
End: 2018-12-06

## 2018-12-06 DIAGNOSIS — R34 ANURIA AND OLIGURIA: ICD-10-CM

## 2018-12-06 LAB
BUN SERPL-MCNC: 25 MG/DL — HIGH (ref 7–23)
CALCIUM SERPL-MCNC: 9.1 MG/DL — SIGNIFICANT CHANGE UP (ref 8.4–10.5)
CHLORIDE SERPL-SCNC: 111 MMOL/L — HIGH (ref 98–107)
CHOLEST SERPL-MCNC: 104 MG/DL — LOW (ref 120–199)
CO2 SERPL-SCNC: 20 MMOL/L — LOW (ref 22–31)
CREAT SERPL-MCNC: 1.09 MG/DL — SIGNIFICANT CHANGE UP (ref 0.5–1.3)
GLUCOSE SERPL-MCNC: 106 MG/DL — HIGH (ref 70–99)
HCT VFR BLD CALC: 28.9 % — LOW (ref 34.5–45)
HDLC SERPL-MCNC: 36 MG/DL — LOW (ref 45–65)
HGB BLD-MCNC: 9.9 G/DL — LOW (ref 11.5–15.5)
LIPID PNL WITH DIRECT LDL SERPL: 47 MG/DL — SIGNIFICANT CHANGE UP
MCHC RBC-ENTMCNC: 29.4 PG — SIGNIFICANT CHANGE UP (ref 27–34)
MCHC RBC-ENTMCNC: 34.3 % — SIGNIFICANT CHANGE UP (ref 32–36)
MCV RBC AUTO: 85.8 FL — SIGNIFICANT CHANGE UP (ref 80–100)
NRBC # FLD: 0.02 — SIGNIFICANT CHANGE UP
PLATELET # BLD AUTO: 169 K/UL — SIGNIFICANT CHANGE UP (ref 150–400)
PMV BLD: 9.7 FL — SIGNIFICANT CHANGE UP (ref 7–13)
POTASSIUM SERPL-MCNC: 4.3 MMOL/L — SIGNIFICANT CHANGE UP (ref 3.5–5.3)
POTASSIUM SERPL-SCNC: 4.3 MMOL/L — SIGNIFICANT CHANGE UP (ref 3.5–5.3)
RBC # BLD: 3.37 M/UL — LOW (ref 3.8–5.2)
RBC # FLD: 17.4 % — HIGH (ref 10.3–14.5)
SODIUM SERPL-SCNC: 142 MMOL/L — SIGNIFICANT CHANGE UP (ref 135–145)
SPECIMEN SOURCE: SIGNIFICANT CHANGE UP
TRIGL SERPL-MCNC: 137 MG/DL — SIGNIFICANT CHANGE UP (ref 10–149)
WBC # BLD: 10.17 K/UL — SIGNIFICANT CHANGE UP (ref 3.8–10.5)
WBC # FLD AUTO: 10.17 K/UL — SIGNIFICANT CHANGE UP (ref 3.8–10.5)

## 2018-12-06 PROCEDURE — 99233 SBSQ HOSP IP/OBS HIGH 50: CPT

## 2018-12-06 RX ADMIN — Medication 975 MILLIGRAM(S): at 14:18

## 2018-12-06 RX ADMIN — Medication 100 MILLIGRAM(S): at 05:19

## 2018-12-06 RX ADMIN — ENOXAPARIN SODIUM 40 MILLIGRAM(S): 100 INJECTION SUBCUTANEOUS at 11:36

## 2018-12-06 RX ADMIN — Medication 1 DROP(S): at 11:36

## 2018-12-06 RX ADMIN — CEFTRIAXONE 100 GRAM(S): 500 INJECTION, POWDER, FOR SOLUTION INTRAMUSCULAR; INTRAVENOUS at 14:18

## 2018-12-06 RX ADMIN — LOSARTAN POTASSIUM 25 MILLIGRAM(S): 100 TABLET, FILM COATED ORAL at 11:36

## 2018-12-06 RX ADMIN — Medication 2000 UNIT(S): at 11:36

## 2018-12-06 RX ADMIN — AMLODIPINE BESYLATE 10 MILLIGRAM(S): 2.5 TABLET ORAL at 05:19

## 2018-12-06 RX ADMIN — LATANOPROST 1 DROP(S): 0.05 SOLUTION/ DROPS OPHTHALMIC; TOPICAL at 21:24

## 2018-12-06 RX ADMIN — Medication 975 MILLIGRAM(S): at 05:19

## 2018-12-06 RX ADMIN — FAMOTIDINE 20 MILLIGRAM(S): 10 INJECTION INTRAVENOUS at 05:19

## 2018-12-06 RX ADMIN — Medication 975 MILLIGRAM(S): at 21:24

## 2018-12-06 RX ADMIN — Medication 1 TABLET(S): at 11:36

## 2018-12-06 RX ADMIN — FAMOTIDINE 20 MILLIGRAM(S): 10 INJECTION INTRAVENOUS at 18:15

## 2018-12-06 NOTE — DISCHARGE NOTE ADULT - REASON FOR ADMISSION
L femur fx-> IM Nail 12/5/18 Left midshaft femur fracture  surgery Left femur intramedullary nail 12/4/2018

## 2018-12-06 NOTE — PROGRESS NOTE ADULT - SUBJECTIVE AND OBJECTIVE BOX
CHIEF COMPLAINT: f/u left hip fracture    SUBJECTIVE / OVERNIGHT EVENTS: Patient seen and examined. No acute events overnight. Pain well controlled and patient without any complaints.    MEDICATIONS  (STANDING):  acetaminophen   Tablet .. 975 milliGRAM(s) Oral every 8 hours  amLODIPine   Tablet 10 milliGRAM(s) Oral daily  cefTRIAXone   IVPB 1 Gram(s) IV Intermittent every 24 hours  cholecalciferol 2000 Unit(s) Oral daily  docusate sodium 100 milliGRAM(s) Oral three times a day  enoxaparin Injectable 40 milliGRAM(s) SubCutaneous daily  famotidine    Tablet 20 milliGRAM(s) Oral every 12 hours  latanoprost 0.005% Ophthalmic Solution 1 Drop(s) Right EYE at bedtime  losartan 25 milliGRAM(s) Oral daily  multivitamin 1 Tablet(s) Oral daily  senna 2 Tablet(s) Oral at bedtime  sodium chloride 0.45%. 1000 milliLiter(s) (75 mL/Hr) IV Continuous <Continuous>  timolol 0.5% Solution 1 Drop(s) Right EYE daily    MEDICATIONS  (PRN):  HYDROmorphone  Injectable 0.5 milliGRAM(s) IV Push every 4 hours PRN Breakthrough Pain  magnesium hydroxide Suspension 30 milliLiter(s) Oral daily PRN Constipation  ondansetron Injectable 4 milliGRAM(s) IV Push every 6 hours PRN Nausea and/or Vomiting  oxyCODONE    IR 2.5 milliGRAM(s) Oral every 4 hours PRN Moderate Pain (4 - 6)  oxyCODONE    IR 5 milliGRAM(s) Oral every 4 hours PRN Severe Pain (7 - 10)  traMADol 25 milliGRAM(s) Oral two times a day PRN Mild Pain (1 - 3)    VITALS:  T(F): 98.4 (12-06-18 @ 10:31), Max: 98.6 (12-05-18 @ 21:46)  HR: 74 (12-06-18 @ 10:31) (72 - 80)  BP: 120/50 (12-06-18 @ 10:31) (114/47 - 141/55)  RR: 18 (12-06-18 @ 10:31) (16 - 18)  SpO2: 98% (12-06-18 @ 10:31)    PHYSICAL EXAM:  GENERAL: NAD, well-developed  CHEST/LUNG: Clear to auscultation bilaterally; No wheeze  HEART: Regular rate and rhythm; No murmurs, rubs, or gallops  ABDOMEN: Soft, Nontender, Nondistended; Bowel sounds present  EXTREMITIES:  2+ Peripheral Pulses, No clubbing, cyanosis, or edema  NEUROLOGY: non-focal  SKIN: dressing C/D/I    LABS:              x                    142  | 20   | 25           x     >-----------< x       ------------------------< 106                   x                    4.3  | 111  | 1.09                                         Ca 9.1   Mg x     Ph x        RADIOLOGY & ADDITIONAL TESTS:  Imaging Personally Reviewed: [x] Yes  < from: Xray Femur 2 Views, Left (12.05.18 @ 14:53) >  IMPRESSION:  Status post long intramedullary abner with femoral neck screws and distal   interlocking screws for an acute left femoral mid shaft fracture.     Overall improved anatomic alignment.    < end of copied text >    [ ] Consultant(s) Notes Reviewed:  [x] Care Discussed with Consultants/Other Providers: Orthopedic PA - discussed management of UTI CHIEF COMPLAINT: f/u left hip fracture    SUBJECTIVE / OVERNIGHT EVENTS: Patient seen and examined. No acute events overnight. Pain well controlled and patient without any complaints.    MEDICATIONS  (STANDING):  acetaminophen   Tablet .. 975 milliGRAM(s) Oral every 8 hours  amLODIPine   Tablet 10 milliGRAM(s) Oral daily  cefTRIAXone   IVPB 1 Gram(s) IV Intermittent every 24 hours  cholecalciferol 2000 Unit(s) Oral daily  docusate sodium 100 milliGRAM(s) Oral three times a day  enoxaparin Injectable 40 milliGRAM(s) SubCutaneous daily  famotidine    Tablet 20 milliGRAM(s) Oral every 12 hours  latanoprost 0.005% Ophthalmic Solution 1 Drop(s) Right EYE at bedtime  losartan 25 milliGRAM(s) Oral daily  multivitamin 1 Tablet(s) Oral daily  senna 2 Tablet(s) Oral at bedtime  sodium chloride 0.45%. 1000 milliLiter(s) (75 mL/Hr) IV Continuous <Continuous>  timolol 0.5% Solution 1 Drop(s) Right EYE daily    MEDICATIONS  (PRN):  HYDROmorphone  Injectable 0.5 milliGRAM(s) IV Push every 4 hours PRN Breakthrough Pain  magnesium hydroxide Suspension 30 milliLiter(s) Oral daily PRN Constipation  ondansetron Injectable 4 milliGRAM(s) IV Push every 6 hours PRN Nausea and/or Vomiting  oxyCODONE    IR 2.5 milliGRAM(s) Oral every 4 hours PRN Moderate Pain (4 - 6)  oxyCODONE    IR 5 milliGRAM(s) Oral every 4 hours PRN Severe Pain (7 - 10)  traMADol 25 milliGRAM(s) Oral two times a day PRN Mild Pain (1 - 3)    VITALS:  T(F): 98.4 (12-06-18 @ 10:31), Max: 98.6 (12-05-18 @ 21:46)  HR: 74 (12-06-18 @ 10:31) (72 - 80)  BP: 120/50 (12-06-18 @ 10:31) (114/47 - 141/55)  RR: 18 (12-06-18 @ 10:31) (16 - 18)  SpO2: 98% (12-06-18 @ 10:31)    PHYSICAL EXAM:  GENERAL: NAD, well-developed  CHEST/LUNG: Clear to auscultation bilaterally; No wheeze  HEART: Regular rate and rhythm; No murmurs, rubs, or gallops  ABDOMEN: Soft, Nontender, Nondistended; Bowel sounds present  EXTREMITIES:  2+ Peripheral Pulses, No clubbing, cyanosis, or edema  NEUROLOGY: non-focal  SKIN: dressing C/D/I    LABS:              x                    142  | 20   | 25           x     >-----------< x       ------------------------< 106                   x                    4.3  | 111  | 1.09                                         Ca 9.1   Mg x     Ph x        RADIOLOGY & ADDITIONAL TESTS:  Imaging Personally Reviewed: [x] Yes  < from: Xray Femur 2 Views, Left (12.05.18 @ 14:53) >  IMPRESSION:  Status post long intramedullary abner with femoral neck screws and distal   interlocking screws for an acute left femoral mid shaft fracture.     Overall improved anatomic alignment.    < end of copied text >    [ ] Consultant(s) Notes Reviewed:  [x] Care Discussed with Consultants/Other Providers: Orthopedic PA - discussed management of UTI    ***History Limited to due to:   [x] Dementia   [ ] Delirium   [ ] Condition    ***Delirium screen:   Patient knows the day of the week: [ ] YES [x] NO  Patient can state the days of the week or months of the year backwards: [ ] YES [x] NO    ***Function:   [ ] Independent  [ ] Assistance  [x] Total care  [ ] Non-ambulatory    ***Living Situation/home resources:  [ ] Lives alone  [x] Lives with family  [x] Has a home health aide    ***Adavanced Directives:  [ ] DNR  [ ] No feeding tube  [ ] MOLST in chart  [ ] MOLST completed today   [x] Unknown    ***Projected Discharge Plan:  [ ] Home  [x] Rehab  [ ] SNF  [ ] Other living facility    ***Discussion:  [x] Discharge plan discussed with patient and family CHIEF COMPLAINT: f/u left hip fracture    SUBJECTIVE / OVERNIGHT EVENTS: Patient seen and examined. No acute events overnight. Pain well controlled and patient without any complaints.    MEDICATIONS  (STANDING):  acetaminophen   Tablet .. 975 milliGRAM(s) Oral every 8 hours  amLODIPine   Tablet 10 milliGRAM(s) Oral daily  cefTRIAXone   IVPB 1 Gram(s) IV Intermittent every 24 hours  cholecalciferol 2000 Unit(s) Oral daily  docusate sodium 100 milliGRAM(s) Oral three times a day  enoxaparin Injectable 40 milliGRAM(s) SubCutaneous daily  famotidine    Tablet 20 milliGRAM(s) Oral every 12 hours  latanoprost 0.005% Ophthalmic Solution 1 Drop(s) Right EYE at bedtime  losartan 25 milliGRAM(s) Oral daily  multivitamin 1 Tablet(s) Oral daily  senna 2 Tablet(s) Oral at bedtime  sodium chloride 0.45%. 1000 milliLiter(s) (75 mL/Hr) IV Continuous <Continuous>  timolol 0.5% Solution 1 Drop(s) Right EYE daily    MEDICATIONS  (PRN):  HYDROmorphone  Injectable 0.5 milliGRAM(s) IV Push every 4 hours PRN Breakthrough Pain  magnesium hydroxide Suspension 30 milliLiter(s) Oral daily PRN Constipation  ondansetron Injectable 4 milliGRAM(s) IV Push every 6 hours PRN Nausea and/or Vomiting  oxyCODONE    IR 2.5 milliGRAM(s) Oral every 4 hours PRN Moderate Pain (4 - 6)  oxyCODONE    IR 5 milliGRAM(s) Oral every 4 hours PRN Severe Pain (7 - 10)  traMADol 25 milliGRAM(s) Oral two times a day PRN Mild Pain (1 - 3)    VITALS:  T(F): 98.4 (12-06-18 @ 10:31), Max: 98.6 (12-05-18 @ 21:46)  HR: 74 (12-06-18 @ 10:31) (72 - 80)  BP: 120/50 (12-06-18 @ 10:31) (114/47 - 141/55)  RR: 18 (12-06-18 @ 10:31) (16 - 18)  SpO2: 98% (12-06-18 @ 10:31)    PHYSICAL EXAM:  GENERAL: NAD, well-developed  CHEST/LUNG: Clear to auscultation bilaterally; No wheeze  HEART: Regular rate and rhythm; No murmurs, rubs, or gallops  ABDOMEN: Soft, Nontender, Nondistended; Bowel sounds present  EXTREMITIES:  2+ Peripheral Pulses, No clubbing, cyanosis, or edema  NEUROLOGY: non-focal  SKIN: dressing C/D/I    LABS:              x                    142  | 20   | 25           x     >-----------< x       ------------------------< 106                   x                    4.3  | 111  | 1.09                                         Ca 9.1   Mg x     Ph x        RADIOLOGY & ADDITIONAL TESTS:  Imaging Personally Reviewed: [x] Yes  < from: Xray Femur 2 Views, Left (12.05.18 @ 14:53) >  IMPRESSION:  Status post long intramedullary abner with femoral neck screws and distal   interlocking screws for an acute left femoral mid shaft fracture.     Overall improved anatomic alignment.    < end of copied text >    [ ] Consultant(s) Notes Reviewed:  [x] Care Discussed with Consultants/Other Providers: Orthopedic PA - discussed management of UTI    ***History Limited to due to:   [x] Dementia   [x] Delirium   [ ] Condition    ***Delirium screen:   Patient knows the day of the week: [ ] YES [x] NO  Patient can state the days of the week or months of the year backwards: [ ] YES [x] NO    ***Function:   [ ] Independent  [ ] Assistance  [x] Total care  [ ] Non-ambulatory    ***Living Situation/home resources:  [ ] Lives alone  [x] Lives with family  [x] Has a home health aide    ***Adavanced Directives:  [ ] DNR  [ ] No feeding tube  [ ] MOLST in chart  [ ] MOLST completed today   [x] Unknown    ***Projected Discharge Plan:  [ ] Home  [x] Rehab  [ ] SNF  [ ] Other living facility    ***Discussion:  [x] Discharge plan discussed with patient and family

## 2018-12-06 NOTE — DISCHARGE NOTE ADULT - ADDITIONAL INSTRUCTIONS
post surgical care  89yo female is s/p Left femur intramedullary nail 12/4/2018 without any intraoperative complications.  Patient is doing well and stable for discharge.  Patient is tolerating physical therapy: weight bearing to Left leg, out of bed for gait training.  Keep dressing on as is until day of staple removal unless soiled.  Staples/sutures if applicable to be removed in Dr. Alba' office 14 days from date of surgery.  Patient is on Lovenox for anticoagulation, and please follow up with Dr. Alba when Lovenox is no longer needed so that patient may resume home med Aspirin 81mg.  Patient is also on antibiotic for presumptive urinary tract infection.  Orthopaedic Surgeon Dr. Alba would like patient to call private MD/Internist for appointment postop to maintain continuity of care.  Follow up with Dr. Alba' office in 1-2 weeks. post surgical care  89yo female is s/p Left femur intramedullary nail 12/4/2018 without any intraoperative complications.  Patient is doing well and stable for discharge.  Patient is tolerating physical therapy: weight bearing to Left leg, out of bed for gait training.  Keep dressing on as is until day of staple removal unless soiled.  Staples/sutures if applicable to be removed in Dr. Alba' office 14 days from date of surgery.  Patient is on Lovenox for anticoagulation, and please follow up with Dr. Alba when Lovenox is no longer needed so that patient may resume home med Aspirin 81mg.  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  Orthopaedic Surgeon Dr. Alba would like patient to call private MD/Internist for appointment postop to maintain continuity of care.  Follow up with Dr. Alba' office in 1-2 weeks.

## 2018-12-06 NOTE — DISCHARGE NOTE ADULT - PLAN OF CARE
fixation of fracture weight bear as tolerated   resume same diet as prior to surgery   Dr Alba 1 week call for appt [phone] pain control -> pain med may cause drowsiness, refrain from activities require decision making; physical therapy to help resume activities of daily living call Surgeon's office to make appointment in 1 week Dr. Alba [phone]  weight bearing as tolerated to Left leg

## 2018-12-06 NOTE — DISCHARGE NOTE ADULT - CARE PLAN
Principal Discharge DX:	Closed displaced spiral fracture of shaft of left femur, initial encounter  Goal:	fixation of fracture  Assessment and plan of treatment:	weight bear as tolerated   resume same diet as prior to surgery   Dr Alba 1 week call for appt [phone] Principal Discharge DX:	Closed displaced spiral fracture of shaft of left femur, initial encounter  Goal:	pain control -> pain med may cause drowsiness, refrain from activities require decision making; physical therapy to help resume activities of daily living  Assessment and plan of treatment:	call Surgeon's office to make appointment in 1 week Dr. Alba [phone]  weight bearing as tolerated to Left leg

## 2018-12-06 NOTE — DISCHARGE NOTE ADULT - PATIENT PORTAL LINK FT
You can access the [facility] Portal, offered by [facility], by registering with the following website: [email or web address]

## 2018-12-06 NOTE — DISCHARGE NOTE ADULT - INSTRUCTIONS
weight bear as tolerated   resume same diet as prior to surgery   Dr Alba 1 week call for appt [phone] resume same diet as prior to surgery Make a follow up appointment with Dr. Alba. Call MD if you develop a fever, or if there is redness, swelling, drainage or pain not relieved by pain medication. No heavy lifting, bending, or straining to move your bowels. Take over the counter stool softeners as needed to prevent constipation which may be caused by pain medication.

## 2018-12-06 NOTE — DISCHARGE NOTE ADULT - HOSPITAL COURSE
89 yo is s/p above without any intraoperative complications.  Pt is doing well and stable for discharge.  Pt is tolerating physical therapy: WBAT,, gait training.  Leave dressing ON until office postop visit   Pt is on Lovenox   for DVT prophylaxis.  Follow up with surgeon in 1 week call for appt. Pt on ceftriaxone for UTI  in hospital will be changed to >>>>>>>>>>>>>upon discharge. 89yo female is s/p Left femur intramedullary nail 12/4/2018 without any intraoperative complications.  Patient is doing well and stable for discharge.  Patient is tolerating physical therapy: weight bearing to Left leg, out of bed for gait training.  Keep dressing on as is until day of staple removal unless soiled.  Staples/sutures if applicable to be removed in Dr. Alba' office 14 days from date of surgery.  Patient is on Lovenox for anticoagulation, and please follow up with Dr. Alba when Lovenox is no longer needed so that patient may resume home med Aspirin 81mg.  Patient is also on antibiotic for presumptive urinary tract infection.  Orthopaedic Surgeon Dr. Alba would like patient to call private MD/Internist for appointment postop to maintain continuity of care.  Follow up with Dr. Alba' office in 1-2 weeks. 89yo female is s/p Left femur intramedullary nail 12/4/2018 without any intraoperative complications.  Patient is doing well and stable for discharge.  Patient is tolerating physical therapy: weight bearing to Left leg, out of bed for gait training.  Keep dressing on as is until day of staple removal unless soiled.  Staples/sutures if applicable to be removed in Dr. Alba' office 14 days from date of surgery.  Patient is on Lovenox for anticoagulation, and please follow up with Dr. Alba when Lovenox is no longer needed so that patient may resume home med Aspirin 81mg.  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  Orthopaedic Surgeon Dr. Alba would like patient to call private MD/Internist for appointment postop to maintain continuity of care.  Follow up with Dr. Alba' office in 1-2 weeks.

## 2018-12-06 NOTE — PROGRESS NOTE ADULT - SUBJECTIVE AND OBJECTIVE BOX
Subjective: Patient seen and examined. No new events except as noted.     SUBJECTIVE/ROS:  NAD      MEDICATIONS:  MEDICATIONS  (STANDING):  acetaminophen   Tablet .. 975 milliGRAM(s) Oral every 8 hours  amLODIPine   Tablet 10 milliGRAM(s) Oral daily  cefTRIAXone   IVPB 1 Gram(s) IV Intermittent every 24 hours  cholecalciferol 2000 Unit(s) Oral daily  docusate sodium 100 milliGRAM(s) Oral three times a day  enoxaparin Injectable 40 milliGRAM(s) SubCutaneous daily  famotidine    Tablet 20 milliGRAM(s) Oral every 12 hours  latanoprost 0.005% Ophthalmic Solution 1 Drop(s) Right EYE at bedtime  losartan 25 milliGRAM(s) Oral daily  multivitamin 1 Tablet(s) Oral daily  senna 2 Tablet(s) Oral at bedtime  sodium chloride 0.45%. 1000 milliLiter(s) (75 mL/Hr) IV Continuous <Continuous>  timolol 0.5% Solution 1 Drop(s) Right EYE daily      PHYSICAL EXAM:  T(C): 36.5 (12-06-18 @ 05:16), Max: 37 (12-05-18 @ 21:46)  HR: 72 (12-06-18 @ 05:16) (72 - 93)  BP: 136/54 (12-06-18 @ 05:16) (114/47 - 141/55)  RR: 16 (12-06-18 @ 05:16) (16 - 18)  SpO2: 100% (12-06-18 @ 05:16) (95% - 100%)  Wt(kg): --  I&O's Summary    05 Dec 2018 07:01  -  06 Dec 2018 07:00  --------------------------------------------------------  IN: 0 mL / OUT: 1275 mL / NET: -1275 mL          JVP: Normal  Neck: supple  Lung: clear   CV: S1 S2 , Murmur:  Abd: soft  Ext: No edema  neuro: Awake / alert  Psych: flat affect  Skin: normal        LABS/DATA:    CARDIAC MARKERS:                                9.9    10.17 )-----------( 169      ( 06 Dec 2018 06:00 )             28.9     12-05    142  |  110<H>  |  31<H>  ----------------------------<  124<H>  4.3   |  18<L>  |  1.12    Ca    8.7      05 Dec 2018 05:41      proBNP:   Lipid Profile:   HgA1c:   TSH:     TELE:  EKG:

## 2018-12-06 NOTE — PROGRESS NOTE ADULT - SUBJECTIVE AND OBJECTIVE BOX
Orthopedic Surgery Progress Note  Pain well controlled.  No chest pain, shortness of breath, light-headedness.    O:  Vital Signs Last 24 Hrs  T(C): 36.5 (06 Dec 2018 05:16), Max: 37 (05 Dec 2018 21:46)  T(F): 97.7 (06 Dec 2018 05:16), Max: 98.6 (05 Dec 2018 21:46)  HR: 72 (06 Dec 2018 05:16) (72 - 93)  BP: 136/54 (06 Dec 2018 05:16) (114/47 - 141/55)  BP(mean): --  RR: 16 (06 Dec 2018 05:16) (16 - 18)  SpO2: 100% (06 Dec 2018 05:16) (94% - 100%)Vital Signs Last 24 Hrs      Gen: NAD  LLE  Dressings C/D/I  EHL/FHL/TA/GS intact  SILT DP/SP/AHN/Sa  WWP distally, DP pulse palpable    Labs:                        9.7    10.73 )-----------( 242      ( 04 Dec 2018 20:20 )             29.2                         10.4   7.85  )-----------( 252      ( 04 Dec 2018 06:25 )             31.6     12-04    140  |  109<H>  |  29<H>  ----------------------------<  111<H>  4.4   |  21<L>  |  1.07    PT/INR - ( 04 Dec 2018 06:25 )   PT: 12.7 SEC;   INR: 1.11        PTT - ( 04 Dec 2018 06:25 )  PTT:33.5 SEC

## 2018-12-06 NOTE — DISCHARGE NOTE ADULT - MEDICATION SUMMARY - MEDICATIONS TO TAKE
I will START or STAY ON the medications listed below when I get home from the hospital:    traMADol 50 mg oral tablet  -- 0.5 tab(s) by mouth 2 times a day as needed for Mild pain  begin tapering off as pain decreases  -- Indication: For Pain control    oxyCODONE  -- 2.5 milligram(s) by mouth every 6 hours as needed for moderate pain  begin tapering off as pain decreases   -- Indication: For Pain control    oxyCODONE 5 mg oral tablet  -- 1 tab(s) by mouth every 6 hours as needed for severe pain  begin tapering off as pain decreases  -- Indication: For Pain control    losartan 25 mg oral tablet  -- 1 tab(s) by mouth once a day  -- Indication: For Home med    enoxaparin  -- 40 milligram(s) subcutaneous once a day  -- Indication: For blood thinner    amLODIPine 10 mg oral tablet  -- 1 tab(s) by mouth once a day  -- Indication: For Home med    famotidine 20 mg oral tablet  -- 1 tab(s) by mouth every 12 hours  -- Indication: For Home med    docusate sodium 100 mg oral capsule  -- 1 cap(s) by mouth 3 times a day  over the counter for constipation caused by pain meds   -- Indication: For stool softener    senna oral tablet  -- 2 tab(s) by mouth once a day (at bedtime)  over the counter for constipation caused by pain meds   -- Indication: For bowel stimulant    Xalatan 0.005% ophthalmic solution  -- 1 drop(s) to each affected eye once a day (in the evening)  -- Indication: For Home med    timolol maleate 0.5% ophthalmic gel forming solution  -- 1 drop(s) to each affected eye once a day  -- Indication: For Home med    Multiple Vitamins oral tablet  -- 1 tab(s) by mouth once a day  -- Indication: For Home med    Vitamin D3 2000 intl units oral tablet  -- 1 tab(s) by mouth once a day  -- Indication: For Home med

## 2018-12-07 LAB — BACTERIA UR CULT: SIGNIFICANT CHANGE UP

## 2018-12-07 PROCEDURE — 99233 SBSQ HOSP IP/OBS HIGH 50: CPT

## 2018-12-07 RX ORDER — FAMOTIDINE 10 MG/ML
1 INJECTION INTRAVENOUS
Qty: 0 | Refills: 0 | COMMUNITY
Start: 2018-12-07

## 2018-12-07 RX ORDER — ENOXAPARIN SODIUM 100 MG/ML
40 INJECTION SUBCUTANEOUS
Qty: 0 | Refills: 0 | COMMUNITY
Start: 2018-12-07

## 2018-12-07 RX ORDER — OXYCODONE HYDROCHLORIDE 5 MG/1
2.5 TABLET ORAL
Qty: 0 | Refills: 0 | COMMUNITY
Start: 2018-12-07

## 2018-12-07 RX ORDER — DOCUSATE SODIUM 100 MG
1 CAPSULE ORAL
Qty: 0 | Refills: 0 | COMMUNITY
Start: 2018-12-07

## 2018-12-07 RX ORDER — OXYCODONE HYDROCHLORIDE 5 MG/1
1 TABLET ORAL
Qty: 0 | Refills: 0 | COMMUNITY
Start: 2018-12-07

## 2018-12-07 RX ORDER — TRAMADOL HYDROCHLORIDE 50 MG/1
0.5 TABLET ORAL
Qty: 0 | Refills: 0 | COMMUNITY
Start: 2018-12-07

## 2018-12-07 RX ORDER — SENNA PLUS 8.6 MG/1
2 TABLET ORAL
Qty: 0 | Refills: 0 | COMMUNITY
Start: 2018-12-07

## 2018-12-07 RX ADMIN — Medication 975 MILLIGRAM(S): at 22:02

## 2018-12-07 RX ADMIN — FAMOTIDINE 20 MILLIGRAM(S): 10 INJECTION INTRAVENOUS at 05:33

## 2018-12-07 RX ADMIN — LOSARTAN POTASSIUM 25 MILLIGRAM(S): 100 TABLET, FILM COATED ORAL at 05:33

## 2018-12-07 RX ADMIN — ENOXAPARIN SODIUM 40 MILLIGRAM(S): 100 INJECTION SUBCUTANEOUS at 12:41

## 2018-12-07 RX ADMIN — LATANOPROST 1 DROP(S): 0.05 SOLUTION/ DROPS OPHTHALMIC; TOPICAL at 22:01

## 2018-12-07 RX ADMIN — Medication 2000 UNIT(S): at 12:42

## 2018-12-07 RX ADMIN — Medication 1 DROP(S): at 12:41

## 2018-12-07 RX ADMIN — Medication 975 MILLIGRAM(S): at 13:12

## 2018-12-07 RX ADMIN — AMLODIPINE BESYLATE 10 MILLIGRAM(S): 2.5 TABLET ORAL at 05:33

## 2018-12-07 RX ADMIN — Medication 975 MILLIGRAM(S): at 05:33

## 2018-12-07 RX ADMIN — Medication 975 MILLIGRAM(S): at 12:42

## 2018-12-07 RX ADMIN — Medication 1 TABLET(S): at 12:41

## 2018-12-07 RX ADMIN — CEFTRIAXONE 100 GRAM(S): 500 INJECTION, POWDER, FOR SOLUTION INTRAMUSCULAR; INTRAVENOUS at 12:43

## 2018-12-07 RX ADMIN — FAMOTIDINE 20 MILLIGRAM(S): 10 INJECTION INTRAVENOUS at 18:45

## 2018-12-07 NOTE — PROGRESS NOTE ADULT - PROBLEM SELECTOR PLAN 6
- c/w losartan and amlodipine   - BP well controlled
- Stable when compared to her prior levels, would monitor for now.
- c/w losartan and amlodipine   - BP well controlled

## 2018-12-07 NOTE — PROGRESS NOTE ADULT - PROBLEM SELECTOR PROBLEM 7
CKD (chronic kidney disease), stage III
CKD (chronic kidney disease), stage III
Primary osteoarthritis involving multiple joints

## 2018-12-07 NOTE — PROGRESS NOTE ADULT - SUBJECTIVE AND OBJECTIVE BOX
CC: Patient is a 89y old  Female who presents with a chief complaint of L femur fx (07 Dec 2018 10:18)    SUBJECTIVE / OVERNIGHT EVENTS: Denies headache, weakness, malaise, fevers, chills, visual changes, chest pain, dyspnea, cough, abdominal pain, nausea, vomiting, diarrhea, constipation, dysuria, hematuria, polyuria, pruritis    MEDICATIONS  (STANDING):  acetaminophen   Tablet .. 975 milliGRAM(s) Oral every 8 hours  amLODIPine   Tablet 10 milliGRAM(s) Oral daily  cholecalciferol 2000 Unit(s) Oral daily  docusate sodium 100 milliGRAM(s) Oral three times a day  enoxaparin Injectable 40 milliGRAM(s) SubCutaneous daily  famotidine    Tablet 20 milliGRAM(s) Oral every 12 hours  latanoprost 0.005% Ophthalmic Solution 1 Drop(s) Right EYE at bedtime  losartan 25 milliGRAM(s) Oral daily  multivitamin 1 Tablet(s) Oral daily  senna 2 Tablet(s) Oral at bedtime  timolol 0.5% Solution 1 Drop(s) Right EYE daily    MEDICATIONS  (PRN):  HYDROmorphone  Injectable 0.5 milliGRAM(s) IV Push every 4 hours PRN Breakthrough Pain  magnesium hydroxide Suspension 30 milliLiter(s) Oral daily PRN Constipation  ondansetron Injectable 4 milliGRAM(s) IV Push every 6 hours PRN Nausea and/or Vomiting  oxyCODONE    IR 2.5 milliGRAM(s) Oral every 4 hours PRN Moderate Pain (4 - 6)  oxyCODONE    IR 5 milliGRAM(s) Oral every 4 hours PRN Severe Pain (7 - 10)  traMADol 25 milliGRAM(s) Oral two times a day PRN Mild Pain (1 - 3)    Vital Signs Last 24 Hrs  T(C): 36.8 (07 Dec 2018 15:23), Max: 37.3 (06 Dec 2018 16:37)  T(F): 98.2 (07 Dec 2018 15:23), Max: 99.2 (06 Dec 2018 16:37)  HR: 66 (07 Dec 2018 15:23) (66 - 80)  BP: 122/55 (07 Dec 2018 15:23) (116/48 - 132/50)  BP(mean): --  RR: 18 (07 Dec 2018 15:23) (18 - 18)  SpO2: 100% (07 Dec 2018 15:23) (97% - 100%)  CAPILLARY BLOOD GLUCOSE    PHYSICAL EXAM:  GENERAL: NAD, well-developed  HEAD:  Atraumatic, Normocephalic  EYES: EOMI, conjunctiva and sclera clear  NECK: Supple, No JVD  CHEST/LUNG: Clear to auscultation bilaterally; No wheeze  HEART: Regular rate and rhythm; No murmurs, rubs, or gallops  ABDOMEN: Soft, Nontender, Nondistended; Bowel sounds present  EXTREMITIES:  2+ Peripheral Pulses, No clubbing, cyanosis. (+) LLE thigh swollen compared to RLE. Incision site C/D/I  PSYCH: AAOx3  NEUROLOGY: non-focal  SKIN: No rashes or lesions    LABS:                        9.9    10.17 )-----------( 169      ( 06 Dec 2018 06:00 )             28.9     12-06    142  |  111<H>  |  25<H>  ----------------------------<  106<H>  4.3   |  20<L>  |  1.09    Ca    9.1      06 Dec 2018 10:25                RADIOLOGY & ADDITIONAL TESTS:    Imaging Personally Reviewed: Yes     Consultant(s) Notes Reviewed:  Cardiology     Care Discussed with Consultants/Other Providers: Orthopedics PA

## 2018-12-07 NOTE — PROGRESS NOTE ADULT - SUBJECTIVE AND OBJECTIVE BOX
Ortho Progress Note  JESSICA PEDRAZA   [id number]    89yFemale is s/p L femur intramedullary nail POD#3 w/out any c/o.  Resting comfortably, NAD, ANO x 1 (self only)      Vital Signs Last 24 Hrs  T(C): 36.8 (07 Dec 2018 05:29), Max: 37.3 (06 Dec 2018 16:37)  T(F): 98.2 (07 Dec 2018 05:29), Max: 99.2 (06 Dec 2018 16:37)  HR: 71 (07 Dec 2018 05:29) (70 - 80)  BP: 131/48 (07 Dec 2018 05:29) (116/48 - 132/50)  BP(mean): --  RR: 18 (07 Dec 2018 05:29) (18 - 18)  SpO2: 97% (07 Dec 2018 05:29) (97% - 100%)  No Known Allergies      S/P L femur intramedullary nail   L thigh wound dressing is C/D/I  motor LLE grossly intact  sensation LLE intact to light touch                          9.9    10.17 )-----------( 169      ( 06 Dec 2018 06:00 )             28.9     12-06    142  |  111<H>  |  25<H>  ----------------------------<  106<H>  4.3   |  20<L>  |  1.09    Ca    9.1      06 Dec 2018 10:25          A/P Ortho Stable s/p L femur intramedullary nail POD#3  acute postop blood loss anemia resolved (h/h 9.9/28.9) after transfusion 12/5  continue Lovenox for anticoagulation - as per Hospitalist, patient to resume home med ASA 81mg when ok by Dr. Alba  c/w 7day course Ceftriaxone for urinary tract infection - awaiting final results of UCx  continue Physical Therapy BID: WBAT, out of bed for gait training  discharge planning to Rehab when bed available

## 2018-12-07 NOTE — PROGRESS NOTE ADULT - PROBLEM SELECTOR PROBLEM 8
Primary osteoarthritis involving multiple joints
History of stroke
Primary osteoarthritis involving multiple joints

## 2018-12-07 NOTE — PROGRESS NOTE ADULT - PROBLEM SELECTOR PLAN 9
- on ASA at home, restart post op as per orthopedics.
- on ASA at home, restart post op as per orthopedics.
-c/w lovenox subq for DVT prophylaxis

## 2018-12-07 NOTE — PROGRESS NOTE ADULT - SUBJECTIVE AND OBJECTIVE BOX
Subjective: Patient seen and examined. No new events except as noted.     SUBJECTIVE/ROS:  NAD      MEDICATIONS:  MEDICATIONS  (STANDING):  acetaminophen   Tablet .. 975 milliGRAM(s) Oral every 8 hours  amLODIPine   Tablet 10 milliGRAM(s) Oral daily  cefTRIAXone   IVPB 1 Gram(s) IV Intermittent every 24 hours  cholecalciferol 2000 Unit(s) Oral daily  docusate sodium 100 milliGRAM(s) Oral three times a day  enoxaparin Injectable 40 milliGRAM(s) SubCutaneous daily  famotidine    Tablet 20 milliGRAM(s) Oral every 12 hours  latanoprost 0.005% Ophthalmic Solution 1 Drop(s) Right EYE at bedtime  losartan 25 milliGRAM(s) Oral daily  multivitamin 1 Tablet(s) Oral daily  senna 2 Tablet(s) Oral at bedtime  timolol 0.5% Solution 1 Drop(s) Right EYE daily      PHYSICAL EXAM:  T(C): 36.8 (12-07-18 @ 10:01), Max: 37.3 (12-06-18 @ 16:37)  HR: 74 (12-07-18 @ 10:01) (70 - 80)  BP: 118/48 (12-07-18 @ 10:01) (116/48 - 132/50)  RR: 18 (12-07-18 @ 10:01) (18 - 18)  SpO2: 100% (12-07-18 @ 10:01) (97% - 100%)  Wt(kg): --  I&O's Summary    06 Dec 2018 07:01  -  07 Dec 2018 07:00  --------------------------------------------------------  IN: 0 mL / OUT: 300 mL / NET: -300 mL      JVP: Normal  Neck: supple  Lung: clear   CV: S1 S2 , Murmur:  Abd: soft  Ext: No edema  neuro: Awake / alert  Psych: flat affect  Skin: normal        LABS/DATA:    CARDIAC MARKERS:                                9.9    10.17 )-----------( 169      ( 06 Dec 2018 06:00 )             28.9     12-06    142  |  111<H>  |  25<H>  ----------------------------<  106<H>  4.3   |  20<L>  |  1.09    Ca    9.1      06 Dec 2018 10:25      proBNP:   Lipid Profile:   HgA1c:   TSH:     TELE:  EKG:

## 2018-12-07 NOTE — PROGRESS NOTE ADULT - PROBLEM SELECTOR PLAN 8
- c/w vitamin D supplementation, pain control as per ortho.
- c/w vitamin D supplementation, pain control as per ortho.
- on ASA at home, restart post op as per orthopedics.

## 2018-12-08 PROCEDURE — 99232 SBSQ HOSP IP/OBS MODERATE 35: CPT

## 2018-12-08 RX ADMIN — Medication 1 TABLET(S): at 14:04

## 2018-12-08 RX ADMIN — Medication 2000 UNIT(S): at 14:03

## 2018-12-08 RX ADMIN — LOSARTAN POTASSIUM 25 MILLIGRAM(S): 100 TABLET, FILM COATED ORAL at 06:02

## 2018-12-08 RX ADMIN — FAMOTIDINE 20 MILLIGRAM(S): 10 INJECTION INTRAVENOUS at 06:01

## 2018-12-08 RX ADMIN — LATANOPROST 1 DROP(S): 0.05 SOLUTION/ DROPS OPHTHALMIC; TOPICAL at 22:44

## 2018-12-08 RX ADMIN — AMLODIPINE BESYLATE 10 MILLIGRAM(S): 2.5 TABLET ORAL at 06:02

## 2018-12-08 RX ADMIN — Medication 975 MILLIGRAM(S): at 06:02

## 2018-12-08 RX ADMIN — ENOXAPARIN SODIUM 40 MILLIGRAM(S): 100 INJECTION SUBCUTANEOUS at 14:03

## 2018-12-08 RX ADMIN — Medication 1 DROP(S): at 14:03

## 2018-12-08 RX ADMIN — Medication 975 MILLIGRAM(S): at 14:04

## 2018-12-08 RX ADMIN — Medication 975 MILLIGRAM(S): at 22:44

## 2018-12-08 NOTE — PROGRESS NOTE ADULT - SUBJECTIVE AND OBJECTIVE BOX
Ortho Progress Note    S: Patient seen and examined. No acute events overnight. Pain well controlled with current regimen. Denies lightheadedness/dizziness, CP/SOB. Tolerating diet.       O:  Physical Exam:  Gen: Laying in bed, NAD, alert and oriented.   Resp: Unlabored breathing  Ext: EHL/FHL/TA/Sol intact          + SILT DP/SP/AHN/Sa          +DP, extremity WWP  Incision: c/d/i, no erythema or edema      Vital Signs Last 24 Hrs  T(C): 36.9 (08 Dec 2018 05:59), Max: 36.9 (08 Dec 2018 05:59)  T(F): 98.4 (08 Dec 2018 05:59), Max: 98.4 (08 Dec 2018 05:59)  HR: 71 (08 Dec 2018 05:59) (66 - 74)  BP: 121/51 (08 Dec 2018 05:59) (118/48 - 129/48)  BP(mean): --  RR: 16 (08 Dec 2018 05:59) (16 - 18)  SpO2: 99% (08 Dec 2018 05:59) (98% - 100%)        12-06    142  |  111<H>  |  25<H>  ----------------------------<  106<H>  4.3   |  20<L>  |  1.09

## 2018-12-08 NOTE — PROGRESS NOTE ADULT - SUBJECTIVE AND OBJECTIVE BOX
Subjective: Patient seen and examined. No new events except as noted.     SUBJECTIVE/ROS:        MEDICATIONS:  MEDICATIONS  (STANDING):  acetaminophen   Tablet .. 975 milliGRAM(s) Oral every 8 hours  amLODIPine   Tablet 10 milliGRAM(s) Oral daily  cholecalciferol 2000 Unit(s) Oral daily  docusate sodium 100 milliGRAM(s) Oral three times a day  enoxaparin Injectable 40 milliGRAM(s) SubCutaneous daily  famotidine    Tablet 20 milliGRAM(s) Oral every 12 hours  latanoprost 0.005% Ophthalmic Solution 1 Drop(s) Right EYE at bedtime  losartan 25 milliGRAM(s) Oral daily  multivitamin 1 Tablet(s) Oral daily  senna 2 Tablet(s) Oral at bedtime  timolol 0.5% Solution 1 Drop(s) Right EYE daily      PHYSICAL EXAM:  T(C): 37 (12-08-18 @ 10:01), Max: 37 (12-08-18 @ 10:01)  HR: 70 (12-08-18 @ 10:01) (66 - 71)  BP: 127/55 (12-08-18 @ 10:01) (121/51 - 129/48)  RR: 16 (12-08-18 @ 10:01) (16 - 18)  SpO2: 100% (12-08-18 @ 10:01) (98% - 100%)  Wt(kg): --  I&O's Summary      JVP: Normal  Neck: supple  Lung: clear   CV: S1 S2 , Murmur:  Abd: soft  Ext: No edema  neuro: Awake / alert  Psych: flat affect  Skin: normal        LABS/DATA:    CARDIAC MARKERS:    proBNP:   Lipid Profile:   HgA1c:   TSH:     TELE:  EKG:

## 2018-12-08 NOTE — PROGRESS NOTE ADULT - SUBJECTIVE AND OBJECTIVE BOX
CC: Patient is a 89y old  Female who presents with a chief complaint of L femur fx (08 Dec 2018 11:57)    SUBJECTIVE / OVERNIGHT EVENTS: Denies headache, weakness, malaise, fevers, chills, visual changes, chest pain, dyspnea, cough, abdominal pain, nausea, vomiting, diarrhea, constipation, dysuria, hematuria, polyuria, pruritis, joint pain    MEDICATIONS  (STANDING):  acetaminophen   Tablet .. 975 milliGRAM(s) Oral every 8 hours  amLODIPine   Tablet 10 milliGRAM(s) Oral daily  cholecalciferol 2000 Unit(s) Oral daily  docusate sodium 100 milliGRAM(s) Oral three times a day  enoxaparin Injectable 40 milliGRAM(s) SubCutaneous daily  famotidine    Tablet 20 milliGRAM(s) Oral every 12 hours  latanoprost 0.005% Ophthalmic Solution 1 Drop(s) Right EYE at bedtime  losartan 25 milliGRAM(s) Oral daily  multivitamin 1 Tablet(s) Oral daily  senna 2 Tablet(s) Oral at bedtime  timolol 0.5% Solution 1 Drop(s) Right EYE daily    MEDICATIONS  (PRN):  HYDROmorphone  Injectable 0.5 milliGRAM(s) IV Push every 4 hours PRN Breakthrough Pain  magnesium hydroxide Suspension 30 milliLiter(s) Oral daily PRN Constipation  ondansetron Injectable 4 milliGRAM(s) IV Push every 6 hours PRN Nausea and/or Vomiting  oxyCODONE    IR 2.5 milliGRAM(s) Oral every 4 hours PRN Moderate Pain (4 - 6)  oxyCODONE    IR 5 milliGRAM(s) Oral every 4 hours PRN Severe Pain (7 - 10)  traMADol 25 milliGRAM(s) Oral two times a day PRN Mild Pain (1 - 3)      Vital Signs Last 24 Hrs  T(C): 36.3 (08 Dec 2018 14:01), Max: 37 (08 Dec 2018 10:01)  T(F): 97.4 (08 Dec 2018 14:01), Max: 98.6 (08 Dec 2018 10:01)  HR: 73 (08 Dec 2018 14:01) (70 - 73)  BP: 132/59 (08 Dec 2018 14:01) (121/51 - 132/59)  BP(mean): --  RR: 18 (08 Dec 2018 14:01) (16 - 18)  SpO2: 98% (08 Dec 2018 14:01) (98% - 100%)  CAPILLARY BLOOD GLUCOSE    PHYSICAL EXAM:  GENERAL: NAD, well-developed  HEAD:  Atraumatic, Normocephalic  EYES: EOMI, conjunctiva and sclera clear  NECK: Supple, No JVD  CHEST/LUNG: Clear to auscultation bilaterally; No wheeze  HEART: Regular rate and rhythm; No murmurs, rubs, or gallops  ABDOMEN: Soft, Nontender, Nondistended; Bowel sounds present  EXTREMITIES:  2+ Peripheral Pulses, No clubbing, cyanosis. (+) LLE thigh swollen compared to RLE. Incision site C/D/I  PSYCH: AAOx3  NEUROLOGY: non-focal  SKIN: No rashes or lesions    LABS:                    RADIOLOGY & ADDITIONAL TESTS:    Imaging Personally Reviewed: Y     Consultant(s) Notes Reviewed:  Y    Care Discussed with Consultants/Other Providers: ANDREW

## 2018-12-09 LAB
ALBUMIN SERPL ELPH-MCNC: 2.8 G/DL — LOW (ref 3.3–5)
ALP SERPL-CCNC: 51 U/L — SIGNIFICANT CHANGE UP (ref 40–120)
ALT FLD-CCNC: 13 U/L — SIGNIFICANT CHANGE UP (ref 4–33)
AST SERPL-CCNC: 29 U/L — SIGNIFICANT CHANGE UP (ref 4–32)
BILIRUB SERPL-MCNC: 1 MG/DL — SIGNIFICANT CHANGE UP (ref 0.2–1.2)
BUN SERPL-MCNC: 18 MG/DL — SIGNIFICANT CHANGE UP (ref 7–23)
CALCIUM SERPL-MCNC: 9 MG/DL — SIGNIFICANT CHANGE UP (ref 8.4–10.5)
CHLORIDE SERPL-SCNC: 111 MMOL/L — HIGH (ref 98–107)
CO2 SERPL-SCNC: 23 MMOL/L — SIGNIFICANT CHANGE UP (ref 22–31)
CREAT SERPL-MCNC: 0.92 MG/DL — SIGNIFICANT CHANGE UP (ref 0.5–1.3)
GLUCOSE SERPL-MCNC: 81 MG/DL — SIGNIFICANT CHANGE UP (ref 70–99)
HCT VFR BLD CALC: 27.6 % — LOW (ref 34.5–45)
HGB BLD-MCNC: 9.1 G/DL — LOW (ref 11.5–15.5)
MAGNESIUM SERPL-MCNC: 1.9 MG/DL — SIGNIFICANT CHANGE UP (ref 1.6–2.6)
MCHC RBC-ENTMCNC: 30.1 PG — SIGNIFICANT CHANGE UP (ref 27–34)
MCHC RBC-ENTMCNC: 33 % — SIGNIFICANT CHANGE UP (ref 32–36)
MCV RBC AUTO: 91.4 FL — SIGNIFICANT CHANGE UP (ref 80–100)
NRBC # FLD: 0 — SIGNIFICANT CHANGE UP
PLATELET # BLD AUTO: 254 K/UL — SIGNIFICANT CHANGE UP (ref 150–400)
PMV BLD: 9.3 FL — SIGNIFICANT CHANGE UP (ref 7–13)
POTASSIUM SERPL-MCNC: 4 MMOL/L — SIGNIFICANT CHANGE UP (ref 3.5–5.3)
POTASSIUM SERPL-SCNC: 4 MMOL/L — SIGNIFICANT CHANGE UP (ref 3.5–5.3)
PROT SERPL-MCNC: 5.9 G/DL — LOW (ref 6–8.3)
RBC # BLD: 3.02 M/UL — LOW (ref 3.8–5.2)
RBC # FLD: 16.1 % — HIGH (ref 10.3–14.5)
SODIUM SERPL-SCNC: 143 MMOL/L — SIGNIFICANT CHANGE UP (ref 135–145)
WBC # BLD: 6.66 K/UL — SIGNIFICANT CHANGE UP (ref 3.8–10.5)
WBC # FLD AUTO: 6.66 K/UL — SIGNIFICANT CHANGE UP (ref 3.8–10.5)

## 2018-12-09 PROCEDURE — 99232 SBSQ HOSP IP/OBS MODERATE 35: CPT

## 2018-12-09 RX ADMIN — Medication 100 MILLIGRAM(S): at 15:58

## 2018-12-09 RX ADMIN — ENOXAPARIN SODIUM 40 MILLIGRAM(S): 100 INJECTION SUBCUTANEOUS at 14:22

## 2018-12-09 RX ADMIN — Medication 975 MILLIGRAM(S): at 07:09

## 2018-12-09 RX ADMIN — Medication 1 TABLET(S): at 14:22

## 2018-12-09 RX ADMIN — Medication 2000 UNIT(S): at 14:22

## 2018-12-09 RX ADMIN — LOSARTAN POTASSIUM 25 MILLIGRAM(S): 100 TABLET, FILM COATED ORAL at 07:10

## 2018-12-09 RX ADMIN — Medication 975 MILLIGRAM(S): at 22:08

## 2018-12-09 RX ADMIN — Medication 1 DROP(S): at 14:22

## 2018-12-09 RX ADMIN — LATANOPROST 1 DROP(S): 0.05 SOLUTION/ DROPS OPHTHALMIC; TOPICAL at 22:09

## 2018-12-09 RX ADMIN — AMLODIPINE BESYLATE 10 MILLIGRAM(S): 2.5 TABLET ORAL at 07:10

## 2018-12-09 RX ADMIN — Medication 975 MILLIGRAM(S): at 14:22

## 2018-12-09 RX ADMIN — FAMOTIDINE 20 MILLIGRAM(S): 10 INJECTION INTRAVENOUS at 18:00

## 2018-12-09 RX ADMIN — FAMOTIDINE 20 MILLIGRAM(S): 10 INJECTION INTRAVENOUS at 07:10

## 2018-12-09 NOTE — PROGRESS NOTE ADULT - SUBJECTIVE AND OBJECTIVE BOX
CC: L femur fx    SUBJECTIVE / OVERNIGHT EVENTS: Denies headache, weakness, malaise, fevers, chills, visual changes, chest pain, dyspnea, cough, abdominal pain, nausea, vomiting, diarrhea, constipation, dysuria, hematuria, polyuria, pruritis, joint pain    MEDICATIONS  (STANDING):  acetaminophen   Tablet .. 975 milliGRAM(s) Oral every 8 hours  amLODIPine   Tablet 10 milliGRAM(s) Oral daily  cholecalciferol 2000 Unit(s) Oral daily  docusate sodium 100 milliGRAM(s) Oral three times a day  enoxaparin Injectable 40 milliGRAM(s) SubCutaneous daily  famotidine    Tablet 20 milliGRAM(s) Oral every 12 hours  latanoprost 0.005% Ophthalmic Solution 1 Drop(s) Right EYE at bedtime  losartan 25 milliGRAM(s) Oral daily  multivitamin 1 Tablet(s) Oral daily  senna 2 Tablet(s) Oral at bedtime  timolol 0.5% Solution 1 Drop(s) Right EYE daily    MEDICATIONS  (PRN):  guaiFENesin    Syrup 100 milliGRAM(s) Oral every 6 hours PRN Cough  HYDROmorphone  Injectable 0.5 milliGRAM(s) IV Push every 4 hours PRN Breakthrough Pain  magnesium hydroxide Suspension 30 milliLiter(s) Oral daily PRN Constipation  ondansetron Injectable 4 milliGRAM(s) IV Push every 6 hours PRN Nausea and/or Vomiting  oxyCODONE    IR 2.5 milliGRAM(s) Oral every 4 hours PRN Moderate Pain (4 - 6)  oxyCODONE    IR 5 milliGRAM(s) Oral every 4 hours PRN Severe Pain (7 - 10)  traMADol 25 milliGRAM(s) Oral two times a day PRN Mild Pain (1 - 3)      Vital Signs Last 24 Hrs  T(C): 36.2 (09 Dec 2018 14:20), Max: 37.2 (08 Dec 2018 22:41)  T(F): 97.2 (09 Dec 2018 14:20), Max: 99 (08 Dec 2018 22:41)  HR: 69 (09 Dec 2018 14:20) (64 - 73)  BP: 136/58 (09 Dec 2018 14:20) (127/59 - 139/57)  BP(mean): --  RR: 16 (09 Dec 2018 14:20) (16 - 18)  SpO2: 98% (09 Dec 2018 14:20) (97% - 100%)  CAPILLARY BLOOD GLUCOSE            PHYSICAL EXAM:  GENERAL: NAD, well-developed  HEAD:  Atraumatic, Normocephalic  EYES: EOMI, conjunctiva and sclera clear  NECK: Supple, No JVD  CHEST/LUNG: Clear to auscultation bilaterally; No wheeze  HEART: Regular rate and rhythm; No murmurs, rubs, or gallops  ABDOMEN: Soft, Nontender, Nondistended; Bowel sounds present  EXTREMITIES:  2+ Peripheral Pulses, No clubbing, cyanosis, or edema  PSYCH: AAOx3  NEUROLOGY: non-focal  SKIN: No rashes or lesions    LABS:                        9.1    6.66  )-----------( 254      ( 09 Dec 2018 06:18 )             27.6     12-09    143  |  111<H>  |  18  ----------------------------<  81  4.0   |  23  |  0.92    Ca    9.0      09 Dec 2018 06:18  Mg     1.9     12-09    TPro  5.9<L>  /  Alb  2.8<L>  /  TBili  1.0  /  DBili  x   /  AST  29  /  ALT  13  /  AlkPhos  51  12-09              RADIOLOGY & ADDITIONAL TESTS:    Imaging Personally Reviewed:    Consultant(s) Notes Reviewed:      Care Discussed with Consultants/Other Providers:

## 2018-12-09 NOTE — PROGRESS NOTE ADULT - SUBJECTIVE AND OBJECTIVE BOX
Orthopaedic Surgery Progress Note    Subjective:   Patient seen and examined  No acute events overnight    Objective:  T(C): 36.9 (12-09-18 @ 01:26), Max: 37.2 (12-08-18 @ 22:41)  HR: 70 (12-09-18 @ 01:26) (68 - 73)  BP: 131/58 (12-09-18 @ 01:26) (121/51 - 138/56)  RR: 18 (12-09-18 @ 01:26) (16 - 18)  SpO2: 98% (12-09-18 @ 01:26) (97% - 100%)  Wt(kg): --      PE    NAD  LLE:   dressing C/D/I  motor intact GS/TA/EHL  SILT S/S/SP/DP  WWP        89y Female s/p L femur IMN  - Pain control  - WBAT  - PT/OT/OOB  - DVT ppx  - Dispo planning

## 2018-12-09 NOTE — PROGRESS NOTE ADULT - SUBJECTIVE AND OBJECTIVE BOX
Subjective: Patient seen and examined. No new events except as noted.     SUBJECTIVE/ROS:  NAD      MEDICATIONS:  MEDICATIONS  (STANDING):  acetaminophen   Tablet .. 975 milliGRAM(s) Oral every 8 hours  amLODIPine   Tablet 10 milliGRAM(s) Oral daily  cholecalciferol 2000 Unit(s) Oral daily  docusate sodium 100 milliGRAM(s) Oral three times a day  enoxaparin Injectable 40 milliGRAM(s) SubCutaneous daily  famotidine    Tablet 20 milliGRAM(s) Oral every 12 hours  latanoprost 0.005% Ophthalmic Solution 1 Drop(s) Right EYE at bedtime  losartan 25 milliGRAM(s) Oral daily  multivitamin 1 Tablet(s) Oral daily  senna 2 Tablet(s) Oral at bedtime  timolol 0.5% Solution 1 Drop(s) Right EYE daily      PHYSICAL EXAM:  T(C): 36.6 (12-09-18 @ 09:40), Max: 37.2 (12-08-18 @ 22:41)  HR: 73 (12-09-18 @ 09:40) (64 - 73)  BP: 136/59 (12-09-18 @ 09:40) (127/59 - 139/57)  RR: 18 (12-09-18 @ 09:40) (18 - 18)  SpO2: 100% (12-09-18 @ 09:40) (97% - 100%)  Wt(kg): --  I&O's Summary      JVP: Normal  Neck: supple  Lung: clear   CV: S1 S2 , Murmur:  Abd: soft  Ext: No edema  neuro: Awake / alert  Psych: flat affect  Skin: normal      LABS/DATA:    CARDIAC MARKERS:                                9.1    6.66  )-----------( 254      ( 09 Dec 2018 06:18 )             27.6     12-09    143  |  111<H>  |  18  ----------------------------<  81  4.0   |  23  |  0.92    Ca    9.0      09 Dec 2018 06:18  Mg     1.9     12-09    TPro  5.9<L>  /  Alb  2.8<L>  /  TBili  1.0  /  DBili  x   /  AST  29  /  ALT  13  /  AlkPhos  51  12-09    proBNP:   Lipid Profile:   HgA1c:   TSH:     TELE:  EKG:

## 2018-12-10 VITALS
SYSTOLIC BLOOD PRESSURE: 122 MMHG | RESPIRATION RATE: 18 BRPM | DIASTOLIC BLOOD PRESSURE: 51 MMHG | TEMPERATURE: 98 F | HEART RATE: 66 BPM | OXYGEN SATURATION: 99 %

## 2018-12-10 LAB
BUN SERPL-MCNC: 19 MG/DL — SIGNIFICANT CHANGE UP (ref 7–23)
CALCIUM SERPL-MCNC: 9.1 MG/DL — SIGNIFICANT CHANGE UP (ref 8.4–10.5)
CHLORIDE SERPL-SCNC: 112 MMOL/L — HIGH (ref 98–107)
CO2 SERPL-SCNC: 22 MMOL/L — SIGNIFICANT CHANGE UP (ref 22–31)
CREAT SERPL-MCNC: 0.84 MG/DL — SIGNIFICANT CHANGE UP (ref 0.5–1.3)
GLUCOSE SERPL-MCNC: 74 MG/DL — SIGNIFICANT CHANGE UP (ref 70–99)
HCT VFR BLD CALC: 25.9 % — LOW (ref 34.5–45)
HGB BLD-MCNC: 8.4 G/DL — LOW (ref 11.5–15.5)
MCHC RBC-ENTMCNC: 29.1 PG — SIGNIFICANT CHANGE UP (ref 27–34)
MCHC RBC-ENTMCNC: 32.4 % — SIGNIFICANT CHANGE UP (ref 32–36)
MCV RBC AUTO: 89.6 FL — SIGNIFICANT CHANGE UP (ref 80–100)
NRBC # FLD: 0.03 — SIGNIFICANT CHANGE UP
PLATELET # BLD AUTO: 298 K/UL — SIGNIFICANT CHANGE UP (ref 150–400)
PMV BLD: 9.3 FL — SIGNIFICANT CHANGE UP (ref 7–13)
POTASSIUM SERPL-MCNC: 3.9 MMOL/L — SIGNIFICANT CHANGE UP (ref 3.5–5.3)
POTASSIUM SERPL-SCNC: 3.9 MMOL/L — SIGNIFICANT CHANGE UP (ref 3.5–5.3)
RBC # BLD: 2.89 M/UL — LOW (ref 3.8–5.2)
RBC # FLD: 16.1 % — HIGH (ref 10.3–14.5)
SODIUM SERPL-SCNC: 143 MMOL/L — SIGNIFICANT CHANGE UP (ref 135–145)
WBC # BLD: 6.94 K/UL — SIGNIFICANT CHANGE UP (ref 3.8–10.5)
WBC # FLD AUTO: 6.94 K/UL — SIGNIFICANT CHANGE UP (ref 3.8–10.5)

## 2018-12-10 PROCEDURE — 99232 SBSQ HOSP IP/OBS MODERATE 35: CPT

## 2018-12-10 PROCEDURE — 99238 HOSP IP/OBS DSCHRG MGMT 30/<: CPT

## 2018-12-10 RX ADMIN — Medication 1 TABLET(S): at 12:19

## 2018-12-10 RX ADMIN — LOSARTAN POTASSIUM 25 MILLIGRAM(S): 100 TABLET, FILM COATED ORAL at 06:52

## 2018-12-10 RX ADMIN — Medication 975 MILLIGRAM(S): at 06:51

## 2018-12-10 RX ADMIN — Medication 1 DROP(S): at 12:19

## 2018-12-10 RX ADMIN — ENOXAPARIN SODIUM 40 MILLIGRAM(S): 100 INJECTION SUBCUTANEOUS at 12:18

## 2018-12-10 RX ADMIN — FAMOTIDINE 20 MILLIGRAM(S): 10 INJECTION INTRAVENOUS at 06:52

## 2018-12-10 RX ADMIN — Medication 2000 UNIT(S): at 12:20

## 2018-12-10 RX ADMIN — AMLODIPINE BESYLATE 10 MILLIGRAM(S): 2.5 TABLET ORAL at 06:52

## 2018-12-10 NOTE — PROGRESS NOTE ADULT - PROBLEM SELECTOR PROBLEM 4
Urine output low
Essential hypertension
Glaucoma of right eye, unspecified glaucoma type
Urine output low

## 2018-12-10 NOTE — PROGRESS NOTE ADULT - SUBJECTIVE AND OBJECTIVE BOX
Orthopaedic Surgery Progress Note    Subjective:   Patient seen and examined  No acute events overnight  Pain well controlled    T(C): 36.6 (12-10-18 @ 01:18), Max: 36.9 (12-09-18 @ 22:07)  HR: 62 (12-10-18 @ 01:18) (62 - 73)  BP: 124/47 (12-10-18 @ 01:18) (123/56 - 139/57)  RR: 18 (12-10-18 @ 01:18) (16 - 18)  SpO2: 98% (12-10-18 @ 01:18) (98% - 100%)  Wt(kg): --                          9.1    6.66  )-----------( 254      ( 09 Dec 2018 06:18 )             27.6     12-09    143  |  111<H>  |  18  ----------------------------<  81  4.0   |  23  |  0.92    Ca    9.0      09 Dec 2018 06:18  Mg     1.9     12-09    TPro  5.9<L>  /  Alb  2.8<L>  /  TBili  1.0  /  DBili  x   /  AST  29  /  ALT  13  /  AlkPhos  51  12-09          PE    NAD  LLE:   incision C/D/I  motor intact GS/TA/EHL  SILT S/S/SP/DP  WWP        89y Female s/p L femur IMN  - Pain control  - WBAT  - PT/OT/OOB  - DVT ppx  - Dispo planning

## 2018-12-10 NOTE — PROGRESS NOTE ADULT - PROBLEM SELECTOR PROBLEM 3
UTI (urinary tract infection)
Glaucoma of right eye, unspecified glaucoma type
UTI (urinary tract infection)
UTI (urinary tract infection)

## 2018-12-10 NOTE — PROGRESS NOTE ADULT - PROBLEM SELECTOR PROBLEM 5
Glaucoma of right eye, unspecified glaucoma type
Need for prophylactic measure
Essential hypertension
Glaucoma of right eye, unspecified glaucoma type
Need for prophylactic measure
Need for prophylactic measure

## 2018-12-10 NOTE — PROGRESS NOTE ADULT - PROBLEM SELECTOR PLAN 5
- c/w her home eye drop medications.
c/w Lovenox for DVT ppx
- Patient on losartan and amlodipine at home  - BP well controlled, can restart losartan
- c/w her home eye drop medications.
c/w Lovenox for DVT ppx
c/w Lovenox for DVT ppx

## 2018-12-10 NOTE — PROGRESS NOTE ADULT - PROBLEM SELECTOR PROBLEM 1
Closed displaced spiral fracture of shaft of left femur, initial encounter

## 2018-12-10 NOTE — PROGRESS NOTE ADULT - REASON FOR ADMISSION
L femur fx
Left midshaft femur fracture  surgery L femur intramedullary nail 12/4/2018
L femur fx

## 2018-12-10 NOTE — PROGRESS NOTE ADULT - SUBJECTIVE AND OBJECTIVE BOX
Subjective: Patient seen and examined. No new events except as noted.     SUBJECTIVE/ROS:  feels ok       MEDICATIONS:  MEDICATIONS  (STANDING):  acetaminophen   Tablet .. 975 milliGRAM(s) Oral every 8 hours  amLODIPine   Tablet 10 milliGRAM(s) Oral daily  cholecalciferol 2000 Unit(s) Oral daily  docusate sodium 100 milliGRAM(s) Oral three times a day  enoxaparin Injectable 40 milliGRAM(s) SubCutaneous daily  famotidine    Tablet 20 milliGRAM(s) Oral every 12 hours  latanoprost 0.005% Ophthalmic Solution 1 Drop(s) Right EYE at bedtime  losartan 25 milliGRAM(s) Oral daily  multivitamin 1 Tablet(s) Oral daily  senna 2 Tablet(s) Oral at bedtime  timolol 0.5% Solution 1 Drop(s) Right EYE daily      PHYSICAL EXAM:  T(C): 36.7 (12-10-18 @ 09:03), Max: 36.9 (12-09-18 @ 22:07)  HR: 67 (12-10-18 @ 09:03) (62 - 69)  BP: 133/49 (12-10-18 @ 09:03) (123/56 - 141/61)  RR: 18 (12-10-18 @ 09:03) (16 - 18)  SpO2: 100% (12-10-18 @ 09:03) (98% - 100%)  Wt(kg): --  I&O's Summary    JVP: Normal  Neck: supple  Lung: clear   CV: S1 S2 , Murmur:  Abd: soft  Ext: No edema  neuro: Awake / alert  Psych: flat affect  Skin: normal      LABS/DATA:    CARDIAC MARKERS:                                8.4    6.94  )-----------( 298      ( 10 Dec 2018 05:37 )             25.9     12-10    143  |  112<H>  |  19  ----------------------------<  74  3.9   |  22  |  0.84    Ca    9.1      10 Dec 2018 05:47  Mg     1.9     12-09    TPro  5.9<L>  /  Alb  2.8<L>  /  TBili  1.0  /  DBili  x   /  AST  29  /  ALT  13  /  AlkPhos  51  12-09    proBNP:   Lipid Profile:   HgA1c:   TSH:     TELE:  EKG:

## 2018-12-10 NOTE — PROGRESS NOTE ADULT - PROVIDER SPECIALTY LIST ADULT
Anesthesia
Cardiology
Hospitalist
Orthopedics
Cardiology
Hospitalist
Hospitalist

## 2018-12-10 NOTE — PROGRESS NOTE ADULT - PROBLEM SELECTOR PROBLEM 2
Postoperative anemia due to acute blood loss

## 2018-12-10 NOTE — PROGRESS NOTE ADULT - PROBLEM SELECTOR PLAN 1
Pain well controlled; continue management and pain control per ortho recs with tramadol prn, dilaudid IV prn and oxycodone IR PO prn.
Pain well controlled; continue management and pain control per ortho recs with tramadol prn, dilaudid IV prn and oxycodone IR PO prn
Pain well controlled; continue management and pain control per ortho recs with tramadol prn, dilaudid IV prn and oxycodone IR PO prn.
Pain well controlled; continue management and pain control per ortho recs with tramadol prn, dilaudid IV prn and oxycodone IR PO prn.
Pain well controlled; continue management and pain control per ortho recs with tramadol prn, dilaudid IV prn and oxycodone PO prn
Pain well controlled; continue management and pain control per ortho recs with tramadol prn, dilaudid IV prn and oxycodone IR PO prn

## 2018-12-10 NOTE — PROGRESS NOTE ADULT - PROBLEM SELECTOR PLAN 3
-f/u UCx results -- initial culture with insufficient growth; culture reincubated  -in the meantime c/w ceftriaxone day 2/7
c/w latonoprost & timolol
-f/u UCx results  -in the meantime c/w ancef day #1 and f/u urine culture
Urine Cx grew Candida, patient is asymptomatic at this time with UOP > 2L in last 24 hours.  - Can complete Ceftriaxone day 3. Given no finding of bacteriuria, would DC ceftriaxone after today   - Patient is asymptomatic without oliguria, and CFU of Candida < 100K CFUs, would defer treating candiduria
c/w latonoprost & timolol
c/w latonoprost & timolol

## 2018-12-10 NOTE — PROGRESS NOTE ADULT - SUBJECTIVE AND OBJECTIVE BOX
Patient is a 89y old  Female who presents with a chief complaint of L femur fx (10 Dec 2018 10:20)        SUBJECTIVE / OVERNIGHT EVENTS:  no acute events o/n  denies complaints  resting comfortably in bed       MEDICATIONS  (STANDING):  acetaminophen   Tablet .. 975 milliGRAM(s) Oral every 8 hours  amLODIPine   Tablet 10 milliGRAM(s) Oral daily  cholecalciferol 2000 Unit(s) Oral daily  docusate sodium 100 milliGRAM(s) Oral three times a day  enoxaparin Injectable 40 milliGRAM(s) SubCutaneous daily  famotidine    Tablet 20 milliGRAM(s) Oral every 12 hours  latanoprost 0.005% Ophthalmic Solution 1 Drop(s) Right EYE at bedtime  losartan 25 milliGRAM(s) Oral daily  multivitamin 1 Tablet(s) Oral daily  senna 2 Tablet(s) Oral at bedtime  timolol 0.5% Solution 1 Drop(s) Right EYE daily    MEDICATIONS  (PRN):  guaiFENesin    Syrup 100 milliGRAM(s) Oral every 6 hours PRN Cough  HYDROmorphone  Injectable 0.5 milliGRAM(s) IV Push every 4 hours PRN Breakthrough Pain  magnesium hydroxide Suspension 30 milliLiter(s) Oral daily PRN Constipation  ondansetron Injectable 4 milliGRAM(s) IV Push every 6 hours PRN Nausea and/or Vomiting  oxyCODONE    IR 2.5 milliGRAM(s) Oral every 4 hours PRN Moderate Pain (4 - 6)  oxyCODONE    IR 5 milliGRAM(s) Oral every 4 hours PRN Severe Pain (7 - 10)  traMADol 25 milliGRAM(s) Oral two times a day PRN Mild Pain (1 - 3)      Vital Signs Last 24 Hrs  T(C): 36.8 (10 Dec 2018 12:48), Max: 36.9 (09 Dec 2018 22:07)  T(F): 98.3 (10 Dec 2018 12:48), Max: 98.5 (09 Dec 2018 22:07)  HR: 66 (10 Dec 2018 12:48) (62 - 67)  BP: 122/51 (10 Dec 2018 12:48) (122/51 - 141/61)  BP(mean): --  RR: 18 (10 Dec 2018 12:48) (17 - 18)  SpO2: 99% (10 Dec 2018 12:48) (98% - 100%)  CAPILLARY BLOOD GLUCOSE        I&O's Summary        PHYSICAL EXAM  PHYSICAL EXAM:  GENERAL: NAD, well-developed  NECK: Supple, No JVD  CHEST/LUNG: Clear to auscultation bilaterally; No wheeze  HEART: Regular rate and rhythm; No murmurs, rubs, or gallops  ABDOMEN: Soft, Nontender, Nondistended; Bowel sounds present  EXTREMITIES:  2+ Peripheral Pulses, No clubbing, cyanosis, or edema  NEUROLOGY: non-focal  SKIN: No rashes or lesions      LABS:                        8.4    6.94  )-----------( 298      ( 10 Dec 2018 05:37 )             25.9     12-10    143  |  112<H>  |  19  ----------------------------<  74  3.9   |  22  |  0.84    Ca    9.1      10 Dec 2018 05:47  Mg     1.9     12-09    TPro  5.9<L>  /  Alb  2.8<L>  /  TBili  1.0  /  DBili  x   /  AST  29  /  ALT  13  /  AlkPhos  51  12-09              RADIOLOGY & ADDITIONAL TESTS:    Imaging Personally Reviewed:  Consultant(s) Notes Reviewed:    Care Discussed with Consultants/Other Providers: Ortho PA

## 2018-12-10 NOTE — PROGRESS NOTE ADULT - ASSESSMENT
89F h/o HTN, CVA without residual deficit, Glaucoma R eye and OA who presents to the hospital with inability to ambulate on L leg found to have a L femur fracture s/p left femur IM nail POD#6
S/P fall vs Syncope  suspect orthostatic hypotension which is common in elderly with deconditioned state  EKG unremarkable  no stenotic murmurs on exam   check orthostatic vitals in future    HTN  stable    History of CVA  asa    Post op anemia  table now  check labs
89F h/o HTN, CVA without residual deficit, Glaucoma R eye and OA who presents to the hospital with inability to ambulate on L leg found to have a L femur fracture s/p left femur IM nail POD#1 found to have a UTI.
89F h/o HTN, CVA without residual deficit, Glaucoma R eye and OA who presents to the hospital with inability to ambulate on L leg found to have a L femur fracture s/p left femur IM nail POD#3 found to have a post-op anemia and UTI with decreased urine output.
89F h/o HTN, CVA without residual deficit, Glaucoma R eye and OA who presents to the hospital with inability to ambulate on L leg found to have a L femur fracture s/p left femur IM nail POD#4.
89F h/o HTN, CVA without residual deficit, Glaucoma R eye and OA who presents to the hospital with inability to ambulate on L leg found to have a L femur fracture s/p left femur IM nail POD#5.
89F sp L femur IMN  ·	Neuro: Pain control  ·	Resp: IS  ·	GI: Regular diet, bowel reg  ·	MSK: WBAT, PT/OT  ·	Heme: DVT PPX    Ortho [id number]
A/P 89y year old female POD2 s/p Intramedullary nail fixation of left femur    Pain Control  DVT PPX  PT/OOB  WBAT   Dispo Planning    Noman Stephens  [id number]
HTN  stable    History of CVA  asa
HTN  stable    History of CVA  asa    DC planning as per primary team
HTN  stable    History of CVA  asa    DC planning as per primary team
S/P fall vs Syncope  suspect orthostatic hypotension which is common in elderly with deconditioned state  EKG unremarkable  no stenotic murmurs on exam   check orthostatic vitals in future    HTN  stable  cont current meds     History of CVA  asa    Post op anemia  table now
S/P fall vs Syncope  suspect orthostatic hypotension which is common in elderly with deconditioned state  EKG unremarkable  no stenotic murmurs on exam   check orthostatic vitals in future    HTN  stable  cont current meds     History of CVA  asa    Post op anemia  transfusion is recommended
89F h/o HTN, CVA without residual deficit, Glaucoma R eye and OA who presents to the hospital with inability to ambulate on L leg found to have a L femur fracture s/p left femur IM nail POD#2 found to have a post-op anemia and UTI with decreased urine output.

## 2018-12-10 NOTE — PROGRESS NOTE ADULT - PROBLEM SELECTOR PLAN 4
-patient was placed on IVF yesterday with improved urine output today  -can d/c IVF and continue to monitor urine output  -TOV today
c/w Amlodipine and losartan. BP satisfactory at this time
- c/w her home eye drop medications.
Patient with UOP of > 2L over last 24 hours, monterroso removed yesterday. Continue to monitor UOP
c/w Amlodipine and losartan. BP satisfactory at this time
c/w Amlodipine and losartan. BP satisfactory at this time

## 2018-12-10 NOTE — PROGRESS NOTE ADULT - PROBLEM SELECTOR PLAN 2
CBC stable, continue to trend CBC daily. No signs of blood loss at this time
CBC stable, continue to trend CBC daily. No signs of blood loss at this time
Was resolving on prior CBC check. Patient not having blood loss at this time. Would re-check CBC and BMP in AM to re-assess hematologic/electrolyte status.
likely secondary to post-op changes; pt hemodynamically stable; s/p 1 unit of pRBC; post-transfusion CBC this am with appropriate response.
likely secondary to post-op changes; pt hemodynamically stable; will transfuse 1 unit of pRBC and f/u CBC in am
likely secondary to post-op changes; pt hemodynamically stable; s/p 1 unit of pRBC; post-transfusion CBC this am with appropriate response.

## 2018-12-10 NOTE — PROGRESS NOTE ADULT - ATTENDING COMMENTS
AGree with above exam and plan
Dispo - to [facility]
11. Dispo:  -d/c to rehab when cleared per primary team.

## 2018-12-13 ENCOUNTER — APPOINTMENT (OUTPATIENT)
Dept: HOME HEALTH SERVICES | Facility: HOME HEALTH | Age: 83
End: 2018-12-13

## 2018-12-13 ENCOUNTER — OTHER (OUTPATIENT)
Age: 83
End: 2018-12-13

## 2018-12-20 ENCOUNTER — OUTPATIENT (OUTPATIENT)
Dept: OUTPATIENT SERVICES | Facility: HOSPITAL | Age: 83
LOS: 1 days | Discharge: HOME | End: 2018-12-20
Payer: MEDICARE

## 2018-12-20 DIAGNOSIS — R52 PAIN, UNSPECIFIED: ICD-10-CM

## 2018-12-20 PROCEDURE — 73562 X-RAY EXAM OF KNEE 3: CPT | Mod: 26,RT

## 2018-12-21 LAB — BACTERIA UR CULT: SIGNIFICANT CHANGE UP

## 2018-12-27 ENCOUNTER — APPOINTMENT (OUTPATIENT)
Dept: ORTHOPEDIC SURGERY | Facility: CLINIC | Age: 83
End: 2018-12-27

## 2019-01-07 ENCOUNTER — APPOINTMENT (OUTPATIENT)
Dept: ORTHOPEDIC SURGERY | Facility: CLINIC | Age: 84
End: 2019-01-07
Payer: MEDICARE

## 2019-01-07 PROCEDURE — 73552 X-RAY EXAM OF FEMUR 2/>: CPT

## 2019-01-07 PROCEDURE — 99024 POSTOP FOLLOW-UP VISIT: CPT

## 2019-01-07 PROCEDURE — 73502 X-RAY EXAM HIP UNI 2-3 VIEWS: CPT | Mod: LT

## 2019-01-07 RX ORDER — TIMOLOL MALEATE 5 MG/ML
0.5 SOLUTION OPHTHALMIC
Qty: 5 | Refills: 0 | Status: ACTIVE | COMMUNITY
Start: 2018-11-14

## 2019-01-14 NOTE — HISTORY OF PRESENT ILLNESS
[de-identified] : S/P IM fixation left femoral shaft fracture 12/4/18  [de-identified] : S/P IM fixation left femoral shaft fracture 12/4/18 doing well presents for post op followup  [de-identified] : left femur \par mild swelling no ecchymosis \par Incisions CDI fully healed no erythema no drainage \par SILT TN SPN DPN SN \par 5/5 TA PT GS EHL FHL \par 2+ distal pulses  [de-identified] : 2 views left femur taken today and reviewed by me show well fixed femoral shaft fracture with IM nail in place no signs of frailure  [de-identified] : S/P IM fixation left femoral shaft fracture 12/4/18 doing well  [de-identified] : S/P IM fixation left femoral shaft fracture 12/4/18  doing well \par - WBAT \par - PT \par - F/U in 4 weeks

## 2019-01-22 ENCOUNTER — APPOINTMENT (OUTPATIENT)
Dept: HOME HEALTH SERVICES | Facility: HOME HEALTH | Age: 84
End: 2019-01-22
Payer: MEDICARE

## 2019-01-22 VITALS
RESPIRATION RATE: 17 BRPM | OXYGEN SATURATION: 97 % | DIASTOLIC BLOOD PRESSURE: 78 MMHG | HEART RATE: 78 BPM | TEMPERATURE: 98.5 F | SYSTOLIC BLOOD PRESSURE: 125 MMHG

## 2019-01-22 DIAGNOSIS — L89.152 PRESSURE ULCER OF SACRAL REGION, STAGE 2: ICD-10-CM

## 2019-01-22 DIAGNOSIS — N18.4 CHRONIC KIDNEY DISEASE, STAGE 4 (SEVERE): ICD-10-CM

## 2019-01-22 DIAGNOSIS — I10 ESSENTIAL (PRIMARY) HYPERTENSION: ICD-10-CM

## 2019-01-22 DIAGNOSIS — F03.90 UNSPECIFIED DEMENTIA W/OUT BEHAVIORAL DISTURBANCE: ICD-10-CM

## 2019-01-22 DIAGNOSIS — S72.142A DISPLACED INTERTROCHANTERIC FRACTURE OF LEFT FEMUR, INITIAL ENCOUNTER FOR CLOSED FRACTURE: ICD-10-CM

## 2019-01-22 DIAGNOSIS — I70.0 ATHEROSCLEROSIS OF AORTA: ICD-10-CM

## 2019-01-22 PROCEDURE — 99349 HOME/RES VST EST MOD MDM 40: CPT | Mod: Q5

## 2019-01-22 NOTE — LETTER HEADER
[Care Plan reviewed and provided to patient/caregiver] : Care plan reviewed and provided to patient/caregiver [Care Plan reviewed every ___ weeks] : Care plan reviewed every [unfilled] weeks [Patient/Caregiver agrees to have other providers send summary of their care to this office] : Patient/caregiver agrees to have other providers send summary of their care to this office [Care Plan managed/Care coordinated by: ___] : Care plan managed/Care coordinated by: [unfilled]

## 2019-01-22 NOTE — CHRONIC CARE ASSESSMENT
[PPS Score: ____] : Palliative Performance Scale (PPS) Score: [unfilled] [Oriented To Person] : ~L oriented to person [Oriented To Place] : ~L oriented to place [Oriented To Time] : ~L oriented to time [Oriented To Situation] : ~L oriented to situation [Alert] : ~L alert

## 2019-01-23 ENCOUNTER — TRANSCRIPTION ENCOUNTER (OUTPATIENT)
Age: 84
End: 2019-01-23

## 2019-01-23 DIAGNOSIS — K11.7 DISTURBANCES OF SALIVARY SECRETION: ICD-10-CM

## 2019-01-24 ENCOUNTER — RX RENEWAL (OUTPATIENT)
Age: 84
End: 2019-01-24

## 2019-01-24 ENCOUNTER — TRANSCRIPTION ENCOUNTER (OUTPATIENT)
Age: 84
End: 2019-01-24

## 2019-01-29 ENCOUNTER — RX RENEWAL (OUTPATIENT)
Age: 84
End: 2019-01-29

## 2019-01-29 DIAGNOSIS — K21.9 GASTRO-ESOPHAGEAL REFLUX DISEASE W/OUT ESOPHAGITIS: ICD-10-CM

## 2019-01-29 DIAGNOSIS — M1A.0190 IDIOPATHIC CHRONIC GOUT, UNSPECIFIED SHOULDER, W/OUT TOPHUS (TOPHI): ICD-10-CM

## 2019-01-29 DIAGNOSIS — M10.9 GOUT, UNSPECIFIED: ICD-10-CM

## 2019-01-29 RX ORDER — ALLOPURINOL 300 MG/1
300 TABLET ORAL DAILY
Qty: 90 | Refills: 0 | Status: ACTIVE | COMMUNITY
Start: 2019-01-06 | End: 1900-01-01

## 2019-01-29 RX ORDER — ASPIRIN 325 MG/1
325 TABLET ORAL
Qty: 28 | Refills: 0 | Status: DISCONTINUED | COMMUNITY
Start: 2019-01-06 | End: 2019-01-29

## 2019-01-29 RX ORDER — FAMOTIDINE 20 MG/1
20 TABLET, FILM COATED ORAL
Qty: 90 | Refills: 2 | Status: ACTIVE | COMMUNITY
Start: 2019-01-06 | End: 1900-01-01

## 2019-01-29 RX ORDER — LOSARTAN POTASSIUM 25 MG/1
25 TABLET, FILM COATED ORAL DAILY
Qty: 90 | Refills: 3 | Status: ACTIVE | COMMUNITY
Start: 2017-12-01 | End: 1900-01-01

## 2019-02-04 ENCOUNTER — APPOINTMENT (OUTPATIENT)
Dept: ORTHOPEDIC SURGERY | Facility: CLINIC | Age: 84
End: 2019-02-04
Payer: MEDICARE

## 2019-02-04 VITALS — BODY MASS INDEX: 23.9 KG/M2 | WEIGHT: 140 LBS | HEIGHT: 64 IN

## 2019-02-04 PROCEDURE — 73552 X-RAY EXAM OF FEMUR 2/>: CPT | Mod: LT

## 2019-02-04 PROCEDURE — 99024 POSTOP FOLLOW-UP VISIT: CPT

## 2019-02-05 NOTE — HISTORY OF PRESENT ILLNESS
[de-identified] : S/P IM fixation left femoral shaft fracture 12/4/18  [de-identified] : S/P IM fixation left femoral shaft fracture 12/4/18 doing well presents for post op followup  [de-identified] : left femur \par no swelling no ecchymosis \par Incisions CDI fully healed no erythema no drainage \par SILT TN SPN DPN SN \par 5/5 TA PT GS EHL FHL \par 2+ distal pulses  [de-identified] : 2 views left femur taken today and reviewed by me show well fixed femoral shaft fracture with IM nail in place no signs of frailure - fracture is healed  [de-identified] : S/P IM fixation left femoral shaft fracture 12/4/18 doing well  [de-identified] : S/P IM fixation left femoral shaft fracture 12/4/18  doing well \par - WBAT \par - PT \par - F/U 3 months

## 2019-02-07 ENCOUNTER — INBOUND DOCUMENT (OUTPATIENT)
Age: 84
End: 2019-02-07

## 2019-02-08 ENCOUNTER — INBOUND DOCUMENT (OUTPATIENT)
Age: 84
End: 2019-02-08

## 2019-02-13 ENCOUNTER — MEDICATION RENEWAL (OUTPATIENT)
Age: 84
End: 2019-02-13

## 2019-02-13 RX ORDER — METRONIDAZOLE 250 MG/1
250 TABLET ORAL
Qty: 1 | Refills: 0 | Status: ACTIVE | COMMUNITY
Start: 2019-02-13 | End: 1900-01-01

## 2019-02-13 RX ORDER — ACETAMINOPHEN 160 MG/5ML
160 SUSPENSION ORAL
Qty: 1 | Refills: 5 | Status: ACTIVE | COMMUNITY
Start: 2019-02-13 | End: 1900-01-01

## 2019-03-07 PROBLEM — S72.142A CLOSED DISPLACED INTERTROCHANTERIC FRACTURE OF LEFT FEMUR, INITIAL ENCOUNTER: Status: ACTIVE | Noted: 2018-12-27

## 2019-03-07 NOTE — REASON FOR VISIT
[Follow-Up] : a follow-up visit [Family Member] : family member [Formal Caregiver] : formal caregiver [Pre-Visit Preparation] : pre-visit preparation was done [Intercurrent Specialty/Sub-specialty Visits] : the patient has no intercurrent specialty/sub-specialty visits [FreeTextEntry1] : Hypertension, Osteoarthritis multiple sites- back, knees, hands, Chair bound,TIA, constipation.

## 2019-03-07 NOTE — REVIEW OF SYSTEMS
[Vision Problems] : vision problems [Lower Ext Edema] : lower extremity edema [Joint Pain] : joint pain [Joint Stiffness] : joint stiffness [Joint Swelling] : joint swelling [Confusion] : confusion [Memory Loss] : memory loss [Unsteady Walking] : ataxia [Negative] : Heme/Lymph [Discharge] : no discharge [Pain] : no pain [Redness] : no redness [Itching] : no itching [Muscle Pain] : no muscle pain [Back Pain] : no back pain [Headache] : no headache [Dizziness] : no dizziness [Fainting] : no fainting [FreeTextEntry3] : decreased visual acuity

## 2019-03-07 NOTE — COUNSELING
[Normal Weight (BMI <25)] : normal weight - BMI <25 [TLC diet recommended] : TLC diet recommended [Non - Smoker] : non-smoker [Medical alert] : medical alert [Vaccinations: _______] : Vaccinations: [unfilled] [Decrease hospital use] : decrease hospital use [Minimize unnecessary interventions] : minimize unnecessary interventions [Discussed disease trajectory with patient/caregiver] : discussed disease trajectory with patient/caregiver [Patient/Caregiver not ready to engage in discussion] : patient/caregiver not ready to engage in discussion [Full Code] : Code Status: Full Code [Limited] : Treatment Guidelines: Limited [Trial of Intubation] : Intubation: Trial of Intubation [[facility] Date: _____] [facility]: [unfilled] [_____] : HCP: [unfilled] [FreeTextEntry2] : Call [phone] for all medical concerns, #1 for Emergencies

## 2019-03-07 NOTE — PHYSICAL EXAM
[No Acute Distress] : no acute distress [Well Nourished] : well nourished [Normal Outer Ear/Nose] : the ears and nose were normal in appearance [Normal Oropharynx] : the oropharynx was normal [Supple] : the neck was supple [No Respiratory Distress] : no respiratory distress [Clear to Auscultation] : lungs were clear to auscultation bilaterally [No Accessory Muscle Use] : no accessory muscle use [Normal Rate] : heart rate was normal  [Regular Rhythm] : with a regular rhythm [Normal S1, S2] : normal S1 and S2 [Pedal Pulses Present] : the pedal pulses are present [No Edema] : there was no peripheral edema [Normal Bowel Sounds] : normal bowel sounds [Non Tender] : non-tender [Soft] : abdomen soft [Not Distended] : not distended [Normal Post Cervical Nodes] : no posterior cervical lymphadenopathy [Normal Anterior Cervical Nodes] : no anterior cervical lymphadenopathy [No CVA Tenderness] : no ~M costovertebral angle tenderness [No Spinal Tenderness] : no spinal tenderness [No Rash] : no rash [No Skin Lesions] : no skin lesions [No Motor Deficits] : the motor exam was normal [No Gross Sensory Deficits] : no gross sensory deficits [Normal Affect] : the affect was normal [Normal Mood] : the mood was normal [Normal Insight/Judgement] : insight and judgment were intact [de-identified] : wears glasses [de-identified] : Bilateral knee joint swelling, right hand contracture [de-identified] : sacral wound stage 2, left heel large DTI [de-identified] : not oriented,

## 2019-03-18 ENCOUNTER — APPOINTMENT (OUTPATIENT)
Dept: ORTHOPEDIC SURGERY | Facility: CLINIC | Age: 84
End: 2019-03-18

## 2019-03-18 ENCOUNTER — RX RENEWAL (OUTPATIENT)
Age: 84
End: 2019-03-18

## 2019-03-18 RX ORDER — ATROPINE SULFATE 10 MG/ML
1 SOLUTION OPHTHALMIC
Qty: 90 | Refills: 5 | Status: ACTIVE | COMMUNITY
Start: 2019-01-23 | End: 1900-01-01

## 2021-10-06 PROBLEM — I10 ESSENTIAL HYPERTENSION: Status: ACTIVE | Noted: 2017-06-14

## 2021-11-24 NOTE — ED ADULT TRIAGE NOTE - PAIN RATING/NUMBER SCALE (0-10): REST
Pt called requesting Breast BX results. She read them in RoomActually and would like to speak with someone re: results.  I informed her I would send message out and someone would return her call she V/U 7

## 2022-04-13 NOTE — ED ADULT NURSE NOTE - CAS TRG GENERAL NORM CIRC DET
[facility] Group Therapy Note      4/13/2022    Location:  [facility]    Clients Presents: [id number], [id number], [id number], [id number]    Clients Absent: [id number], [id number]    Length of session: 1.5 hours    Group Note: OP    Group Type: Women's    New members were welcomed and introduced. Norms and expectations of group were discussed. Content: Counselor facilitated client check in information. Counselor presented a topic focused discussion on sober support. Client identified sober support network and ways to build more sober support. Client identified any toxic people she needs to set boundaries with or cut off totally.      Elisabet Rosa, [employer]  4/13/2022 5:30 PM    Co-Therapist: N/A      [facility] Individual Group Progress Note    Jeannie Chandler  1993 4/13/2022    Notes on Client Progress in Group    Reason for Absence: cancelled      Elisabet Rosa, [employer]  4/13/2022 5:45 PM    Co-Therapist: N/A Strong peripheral pulses

## 2022-04-19 NOTE — CONSULT NOTE ADULT - PROBLEM SELECTOR PROBLEM 3
----- Message from Luma Stokes sent at 4/19/2022  9:02 AM CDT -----  Harrison is scheduled for the video swallow study ordered by Dr. Gusman on May 3, 2022. This test requires a COVID test regardless of patients vaccination statues. Can you please enter an order for the COVID test?    Thank You,    Luma     Essential hypertension

## 2023-12-04 NOTE — ED ADULT TRIAGE NOTE - CHIEF COMPLAINT QUOTE
pt bibems from home, here for left leg pain and obvious swelling to left thigh, pain is causing trouble with ambulation, no falls or trauma
No

## 2024-04-09 NOTE — ED PROVIDER NOTE - PHYSICAL EXAMINATION
Gen: Well appearing in NAD  Head: NC/AT  Neck: trachea midline  Resp:  No distress  CV:  RRR  Ext: no deformities except for LLE which is swollen in the mid thigh and TTP.  Leg is shortened and externally rotated with good pulses  Neuro:  Alert appears non focal  Skin:  Warm and dry as visualized
None known

## 2025-01-08 NOTE — PROGRESS NOTE ADULT - PROBLEM SELECTOR PLAN 7
- Stable when compared to her prior levels, would monitor for now.
- Stable when compared to her prior levels, would monitor for now.
- c/w vitamin D supplementation, pain control as per ortho.
hard copy, drawn during this pregnancy

## 2025-03-10 NOTE — HISTORY OF PRESENT ILLNESS
Patient admitted for seizure [Patient] : patient [Family Member] : family member [FreeTextEntry1] : Severe Osteoarthritis [FreeTextEntry2] : 89 year old female with Hypertension, Osteoarthritis multiple sites- back, knees, hands, Chair bound, TIA.\par  Seen today for follow up of chronic medical conditions.\par \par Accompanying patient today is son, Robby\par \par Was hospitalized Dec 4-10th s/p Left femur fx, s/p intramedullary nail 12/4/2018 \par Orzac d/c jan 9th. \par post rehab developed sacral wound stage 2, and a large left heel DTI. \par Wound care in place. \par \par Patient continues to be dependent with ADLs and IADLs, patient continues to be mainly chair/bed bound\par \par Heart disease: history of TIAs, cont ASA. \par HTN: controlled with Losartan and Amlodipine. \par CKD 4: monitoring \par Son requested hospice referral.  \par